# Patient Record
Sex: FEMALE | Race: WHITE | NOT HISPANIC OR LATINO
[De-identification: names, ages, dates, MRNs, and addresses within clinical notes are randomized per-mention and may not be internally consistent; named-entity substitution may affect disease eponyms.]

---

## 2017-12-05 ENCOUNTER — APPOINTMENT (OUTPATIENT)
Dept: INTERNAL MEDICINE | Facility: CLINIC | Age: 82
End: 2017-12-05
Payer: MEDICARE

## 2017-12-05 VITALS
HEART RATE: 70 BPM | DIASTOLIC BLOOD PRESSURE: 76 MMHG | RESPIRATION RATE: 16 BRPM | TEMPERATURE: 97.6 F | BODY MASS INDEX: 22.12 KG/M2 | SYSTOLIC BLOOD PRESSURE: 132 MMHG | WEIGHT: 119 LBS

## 2017-12-05 DIAGNOSIS — Z78.9 OTHER SPECIFIED HEALTH STATUS: ICD-10-CM

## 2017-12-05 DIAGNOSIS — Z86.39 PERSONAL HISTORY OF OTHER ENDOCRINE, NUTRITIONAL AND METABOLIC DISEASE: ICD-10-CM

## 2017-12-05 PROCEDURE — 99205 OFFICE O/P NEW HI 60 MIN: CPT

## 2018-03-20 ENCOUNTER — APPOINTMENT (OUTPATIENT)
Dept: INTERNAL MEDICINE | Facility: CLINIC | Age: 83
End: 2018-03-20
Payer: MEDICARE

## 2018-03-20 VITALS
TEMPERATURE: 98.3 F | HEART RATE: 74 BPM | RESPIRATION RATE: 16 BRPM | DIASTOLIC BLOOD PRESSURE: 74 MMHG | SYSTOLIC BLOOD PRESSURE: 128 MMHG

## 2018-03-20 VITALS — BODY MASS INDEX: 22.95 KG/M2 | WEIGHT: 123.46 LBS

## 2018-03-20 PROCEDURE — 99213 OFFICE O/P EST LOW 20 MIN: CPT

## 2018-03-20 RX ORDER — DIAZEPAM 5 MG/1
5 TABLET ORAL
Qty: 30 | Refills: 0 | Status: DISCONTINUED | COMMUNITY
Start: 2017-11-28

## 2018-07-31 ENCOUNTER — NON-APPOINTMENT (OUTPATIENT)
Age: 83
End: 2018-07-31

## 2018-07-31 ENCOUNTER — APPOINTMENT (OUTPATIENT)
Dept: INTERNAL MEDICINE | Facility: CLINIC | Age: 83
End: 2018-07-31
Payer: MEDICARE

## 2018-07-31 VITALS
HEART RATE: 78 BPM | TEMPERATURE: 96.7 F | DIASTOLIC BLOOD PRESSURE: 78 MMHG | RESPIRATION RATE: 14 BRPM | SYSTOLIC BLOOD PRESSURE: 160 MMHG

## 2018-07-31 VITALS — WEIGHT: 114 LBS | BODY MASS INDEX: 18.99 KG/M2 | HEIGHT: 65 IN

## 2018-07-31 DIAGNOSIS — J20.9 ACUTE BRONCHITIS, UNSPECIFIED: ICD-10-CM

## 2018-07-31 DIAGNOSIS — M81.0 AGE-RELATED OSTEOPOROSIS W/OUT CURRENT PATHOLOGICAL FRACTURE: ICD-10-CM

## 2018-07-31 DIAGNOSIS — N95.2 POSTMENOPAUSAL ATROPHIC VAGINITIS: ICD-10-CM

## 2018-07-31 PROCEDURE — G0439: CPT

## 2018-07-31 PROCEDURE — 93000 ELECTROCARDIOGRAM COMPLETE: CPT

## 2018-07-31 PROCEDURE — 99215 OFFICE O/P EST HI 40 MIN: CPT | Mod: 25

## 2018-07-31 PROCEDURE — 36415 COLL VENOUS BLD VENIPUNCTURE: CPT

## 2018-07-31 RX ORDER — PROMETHAZINE HYDROCHLORIDE AND DEXTROMETHORPHAN HYDROBROMIDE ORAL SOLUTION 15; 6.25 MG/5ML; MG/5ML
6.25-15 SOLUTION ORAL EVERY 6 HOURS
Qty: 400 | Refills: 1 | Status: DISCONTINUED | COMMUNITY
Start: 2017-12-05 | End: 2018-07-31

## 2018-07-31 RX ORDER — FUROSEMIDE 40 MG/1
40 TABLET ORAL
Qty: 90 | Refills: 0 | Status: DISCONTINUED | COMMUNITY
Start: 2017-06-07 | End: 2018-07-31

## 2018-07-31 RX ORDER — ALENDRONATE SODIUM 70 MG/1
70 TABLET ORAL
Qty: 4 | Refills: 0 | Status: DISCONTINUED | COMMUNITY
Start: 2018-02-09 | End: 2018-07-31

## 2018-07-31 NOTE — ASSESSMENT
[FreeTextEntry1] : Diagnoses #1 rheumatic mitral stenosis, stable. Some mild dyspnea on exertion is chronic. Patient is followed by the cardiologist\par #2 hypertension. Blood pressure is elevated. Patient refused to take any medications for that. patient to be on a low-salt diet and she's going to check her blood pressure at her house. Patient is going to report to me if the blood pressure is elevated.\par #3 hyperlipidemia. Continue same treatment and low fat diet\par #4 rule out liver toxicity due to chronic medication use\par #5 osteoporosis. Patient has prolia  injections every 6 months by the rheumatologist\par #6 rheumatoid arthritis, stable . Patient is followed by the rheumatologist\par #7 history of pulmonary fibrosis, stable. Patient is followed by the pulmonary doctor\par #8 slight depression. Patient is not suicidal. Patient refused to increase escitalopram to 10 mg p.o. once a day\par Plan. Patient advised to return 6 weeks

## 2018-07-31 NOTE — HEALTH RISK ASSESSMENT
[Good] : ~his/her~ current health as good [Patient reported mammogram was normal] : Patient reported mammogram was normal [Patient reported PAP Smear was normal] : Patient reported PAP Smear was normal [None] : None [With Family] : lives with family [Retired] : retired [High School] : high school [] :  [Fully functional (bathing, dressing, toileting, transferring, walking, feeding)] : Fully functional (bathing, dressing, toileting, transferring, walking, feeding) [Fully functional (using the telephone, shopping, preparing meals, housekeeping, doing laundry, using] : Fully functional and needs no help or supervision to perform IADLs (using the telephone, shopping, preparing meals, housekeeping, doing laundry, using transportation, managing medications and managing finances) [Smoke Detector] : smoke detector [Carbon Monoxide Detector] : carbon monoxide detector [Safety elements used in home] : safety elements used in home [Seat Belt] :  uses seat belt [Sunscreen] : uses sunscreen [Caregiver Concerns] : has caregiver concerns [Discussed at today's visit] : Advance Directives Discussed at today's visit [] : No [Change in mental status noted] : No change in mental status noted [Language] : denies difficulty with language [Sexually Active] : not sexually active [Reports changes in hearing] : Reports no changes in hearing [Reports changes in vision] : Reports no changes in vision [Reports changes in dental health] : Reports no changes in dental health [Guns at Home] : no guns at home [Travel to Developing Areas] : does not  travel to developing areas [TB Exposure] : is not being exposed to tuberculosis

## 2018-07-31 NOTE — HISTORY OF PRESENT ILLNESS
[FreeTextEntry1] : Patient comes for a Medicare annual wellness visit and followup of her chronic medical problems like rheumatic mitral stenosis , hypertension, hyperlipidemia, rheumatoid arthritis. Patient feels well without any chest pain, weakness, paroxysmal nocturnal dyspnea ,orthopnea. She has though  chronic slight dyspnea on exception and she is followed by the cardiologist for that

## 2018-07-31 NOTE — PHYSICAL EXAM
[No Acute Distress] : no acute distress [Well Nourished] : well nourished [Well Developed] : well developed [Well-Appearing] : well-appearing [Normal Sclera/Conjunctiva] : normal sclera/conjunctiva [PERRL] : pupils equal round and reactive to light [EOMI] : extraocular movements intact [Normal Outer Ear/Nose] : the outer ears and nose were normal in appearance [Normal Oropharynx] : the oropharynx was normal [Normal TMs] : both tympanic membranes were normal [Normal Nasal Mucosa] : the nasal mucosa was normal [No JVD] : no jugular venous distention [Supple] : supple [No Lymphadenopathy] : no lymphadenopathy [Thyroid Normal, No Nodules] : the thyroid was normal and there were no nodules present [No Respiratory Distress] : no respiratory distress  [Clear to Auscultation] : lungs were clear to auscultation bilaterally [No Accessory Muscle Use] : no accessory muscle use [Normal Percussion] : the chest was normal to percussion [Normal Rate] : normal rate  [Regular Rhythm] : with a regular rhythm [Normal S1, S2] : normal S1 and S2 [No Murmur] : no murmur heard [No Carotid Bruits] : no carotid bruits [No Abdominal Bruit] : a ~M bruit was not heard ~T in the abdomen [No Varicosities] : no varicosities [Pedal Pulses Present] : the pedal pulses are present [No Edema] : there was no peripheral edema [No Extremity Clubbing/Cyanosis] : no extremity clubbing/cyanosis [No Palpable Aorta] : no palpable aorta [Normal Appearance] : normal in appearance [No Nipple Discharge] : no nipple discharge [No Axillary Lymphadenopathy] : no axillary lymphadenopathy [Soft] : abdomen soft [Non Tender] : non-tender [Non-distended] : non-distended [No Masses] : no abdominal mass palpated [No HSM] : no HSM [Normal Bowel Sounds] : normal bowel sounds [No Hernias] : no hernias [Normal Sphincter Tone] : normal sphincter tone [No Mass] : no mass [Normal Axillary Nodes] : no axillary lymphadenopathy [Normal Posterior Cervical Nodes] : no posterior cervical lymphadenopathy [Normal Femoral Nodes] : no femoral lymphadenopathy [Normal Anterior Cervical Nodes] : no anterior cervical lymphadenopathy [Normal Inguinal Nodes] : no inguinal lymphadenopathy [No CVA Tenderness] : no CVA  tenderness [No Spinal Tenderness] : no spinal tenderness [No Joint Swelling] : no joint swelling [Grossly Normal Strength/Tone] : grossly normal strength/tone [No Rash] : no rash [No Skin Lesions] : no skin lesions [Normal Gait] : normal gait [Coordination Grossly Intact] : coordination grossly intact [No Focal Deficits] : no focal deficits [Deep Tendon Reflexes (DTR)] : deep tendon reflexes were 2+ and symmetric [Speech Grossly Normal] : speech grossly normal [Memory Grossly Normal] : memory grossly normal [Normal Affect] : the affect was normal [Alert and Oriented x3] : oriented to person, place, and time [Normal Mood] : the mood was normal [Normal Insight/Judgement] : insight and judgment were intact [Stool Occult Blood] : no occult stool [Kyphosis] : no kyphosis [Scoliosis] : no scoliosis

## 2018-08-01 LAB
25(OH)D3 SERPL-MCNC: 26.6 NG/ML
ALBUMIN SERPL ELPH-MCNC: 4.5 G/DL
ALP BLD-CCNC: 65 U/L
ALT SERPL-CCNC: 17 U/L
ANION GAP SERPL CALC-SCNC: 13 MMOL/L
AST SERPL-CCNC: 28 U/L
BASOPHILS # BLD AUTO: 0.03 K/UL
BASOPHILS NFR BLD AUTO: 0.4 %
BILIRUB SERPL-MCNC: 0.8 MG/DL
BUN SERPL-MCNC: 23 MG/DL
CALCIUM SERPL-MCNC: 9.1 MG/DL
CHLORIDE SERPL-SCNC: 99 MMOL/L
CHOLEST SERPL-MCNC: 180 MG/DL
CHOLEST/HDLC SERPL: 2.8 RATIO
CO2 SERPL-SCNC: 27 MMOL/L
CREAT SERPL-MCNC: 0.87 MG/DL
EOSINOPHIL # BLD AUTO: 0.16 K/UL
EOSINOPHIL NFR BLD AUTO: 2.2 %
GLUCOSE SERPL-MCNC: 93 MG/DL
HCT VFR BLD CALC: 41.2 %
HDLC SERPL-MCNC: 65 MG/DL
HGB BLD-MCNC: 12.4 G/DL
IMM GRANULOCYTES NFR BLD AUTO: 0.3 %
LDLC SERPL CALC-MCNC: 99 MG/DL
LYMPHOCYTES # BLD AUTO: 2.22 K/UL
LYMPHOCYTES NFR BLD AUTO: 31.2 %
MAN DIFF?: NORMAL
MCHC RBC-ENTMCNC: 29.4 PG
MCHC RBC-ENTMCNC: 30.1 GM/DL
MCV RBC AUTO: 97.6 FL
MONOCYTES # BLD AUTO: 0.41 K/UL
MONOCYTES NFR BLD AUTO: 5.8 %
NEUTROPHILS # BLD AUTO: 4.28 K/UL
NEUTROPHILS NFR BLD AUTO: 60.1 %
PLATELET # BLD AUTO: 250 K/UL
POTASSIUM SERPL-SCNC: 4.3 MMOL/L
PROT SERPL-MCNC: 7.1 G/DL
RBC # BLD: 4.22 M/UL
RBC # FLD: 15 %
SODIUM SERPL-SCNC: 139 MMOL/L
TRIGL SERPL-MCNC: 82 MG/DL
WBC # FLD AUTO: 7.12 K/UL

## 2018-12-03 ENCOUNTER — APPOINTMENT (OUTPATIENT)
Dept: INTERNAL MEDICINE | Facility: CLINIC | Age: 83
End: 2018-12-03
Payer: MEDICARE

## 2018-12-03 ENCOUNTER — LABORATORY RESULT (OUTPATIENT)
Age: 83
End: 2018-12-03

## 2018-12-03 VITALS
SYSTOLIC BLOOD PRESSURE: 140 MMHG | DIASTOLIC BLOOD PRESSURE: 72 MMHG | RESPIRATION RATE: 16 BRPM | BODY MASS INDEX: 19.16 KG/M2 | WEIGHT: 115 LBS | HEIGHT: 65 IN | TEMPERATURE: 98.3 F | OXYGEN SATURATION: 98 % | HEART RATE: 80 BPM

## 2018-12-03 LAB
BILIRUB UR QL STRIP: NEGATIVE
GLUCOSE UR-MCNC: NEGATIVE
HCG UR QL: 0.2 EU/DL
HGB UR QL STRIP.AUTO: ABNORMAL
KETONES UR-MCNC: NEGATIVE
LEUKOCYTE ESTERASE UR QL STRIP: ABNORMAL
NITRITE UR QL STRIP: NEGATIVE
PH UR STRIP: 7
PROT UR STRIP-MCNC: NEGATIVE
SP GR UR STRIP: 1.01

## 2018-12-03 PROCEDURE — 81003 URINALYSIS AUTO W/O SCOPE: CPT | Mod: QW

## 2018-12-03 PROCEDURE — 90662 IIV NO PRSV INCREASED AG IM: CPT

## 2018-12-03 PROCEDURE — 99215 OFFICE O/P EST HI 40 MIN: CPT | Mod: 25

## 2018-12-03 PROCEDURE — G0008: CPT

## 2018-12-03 NOTE — HISTORY OF PRESENT ILLNESS
[FreeTextEntry1] : Patient presents for chronic disease management [de-identified] : Patient presents for chronic disease management. Patient concerned about left rib and under arm pain. Pain has been for 6 months.  Patient denies any trauma, fevers, chills or unexpected weight loss. Patient denies any sudden movements. Patient denies any chest tightness or shortness of breath. Patient states she has depression after son passed away. States talking about it makes her feel better. Patient states she has not taken escitalopram.

## 2018-12-03 NOTE — ASSESSMENT
[FreeTextEntry1] : 1) HTN: stable, advised to continue current management. Counseled on low salt diet\par 2) Hyperlipidemia: Will check lipid panel, discussed low fat diet.\par 3) Rib pain on left side: Patient has tenderness to palpation of left side of ribs. Patient denies any trauma. May be secondary to RA? or muscle strain. Given point tenderness will check chest x-ray\par 4) Rheumatic mitral stenosis: Patient denies any dyspnea, will f/u with cardiology at the end of the month.\par 5) Reactive depression: PHQ-9 score of 4 consistent with mild depression. Patient declined talk therapy and not currently interested in SSRI. 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 Patient denies any dyspnea. \par 7)Hypovitaminosis D: Check Vitamin D levels\par RTO 2 months.

## 2018-12-03 NOTE — REVIEW OF SYSTEMS
[Joint Pain] : joint pain [Negative] : Integumentary [Joint Stiffness] : no joint stiffness [Joint Swelling] : no joint swelling [Muscle Weakness] : no muscle weakness [Muscle Pain] : no muscle pain [Back Pain] : no back pain

## 2018-12-03 NOTE — PHYSICAL EXAM
[No Acute Distress] : no acute distress [Well Nourished] : well nourished [Well Developed] : well developed [Well-Appearing] : well-appearing [Normal Sclera/Conjunctiva] : normal sclera/conjunctiva [PERRL] : pupils equal round and reactive to light [EOMI] : extraocular movements intact [Normal Outer Ear/Nose] : the outer ears and nose were normal in appearance [Normal Oropharynx] : the oropharynx was normal [Normal TMs] : both tympanic membranes were normal [Normal Nasal Mucosa] : the nasal mucosa was normal [No JVD] : no jugular venous distention [Supple] : supple [No Lymphadenopathy] : no lymphadenopathy [Thyroid Normal, No Nodules] : the thyroid was normal and there were no nodules present [No Respiratory Distress] : no respiratory distress  [No Accessory Muscle Use] : no accessory muscle use [Normal Rate] : normal rate  [Regular Rhythm] : with a regular rhythm [Normal S1, S2] : normal S1 and S2 [No Carotid Bruits] : no carotid bruits [No Abdominal Bruit] : a ~M bruit was not heard ~T in the abdomen [No Varicosities] : no varicosities [Pedal Pulses Present] : the pedal pulses are present [No Edema] : there was no peripheral edema [No Extremity Clubbing/Cyanosis] : no extremity clubbing/cyanosis [No Palpable Aorta] : no palpable aorta [Normal Appearance] : normal in appearance [No Nipple Discharge] : no nipple discharge [Soft] : abdomen soft [Non Tender] : non-tender [Non-distended] : non-distended [No Masses] : no abdominal mass palpated [No HSM] : no HSM [Normal Bowel Sounds] : normal bowel sounds [Normal Supraclavicular Nodes] : no supraclavicular lymphadenopathy [Normal Posterior Cervical Nodes] : no posterior cervical lymphadenopathy [Normal Anterior Cervical Nodes] : no anterior cervical lymphadenopathy [No CVA Tenderness] : no CVA  tenderness [No Spinal Tenderness] : no spinal tenderness [No Joint Swelling] : no joint swelling [Grossly Normal Strength/Tone] : grossly normal strength/tone [No Rash] : no rash [Normal Gait] : normal gait [Coordination Grossly Intact] : coordination grossly intact [No Focal Deficits] : no focal deficits [Speech Grossly Normal] : speech grossly normal [Normal Insight/Judgement] : insight and judgment were intact [de-identified] : Fine crackles heard bilaterally across all lung zones [de-identified] : Tenderness to palpation of left ribs [de-identified] : Tearful during exam

## 2018-12-03 NOTE — HEALTH RISK ASSESSMENT
[Very Good] : ~his/her~  mood as very good [No falls in past year] : Patient reported no falls in the past year [1] : 2) Feeling down, depressed, or hopeless for several days (1) [None] : None [With Significant Other] : lives with significant other [] :  [Feels Safe at Home] : Feels safe at home [Fully functional (bathing, dressing, toileting, transferring, walking, feeding)] : Fully functional (bathing, dressing, toileting, transferring, walking, feeding) [Fully functional (using the telephone, shopping, preparing meals, housekeeping, doing laundry, using] : Fully functional and needs no help or supervision to perform IADLs (using the telephone, shopping, preparing meals, housekeeping, doing laundry, using transportation, managing medications and managing finances) [Smoke Detector] : smoke detector [Guns at Home] : guns at home [Safety elements used in home] : safety elements used in home [Seat Belt] :  uses seat belt [Sunscreen] : uses sunscreen [] : No [[id number]] : 4 [Change in mental status noted] : No change in mental status noted [Language] : denies difficulty with language [Behavior] : denies difficulty with behavior [Learning/Retaining New Information] : denies difficulty learning/retaining new information [Handling Complex Tasks] : denies difficulty handling complex tasks [Reasoning] : denies difficulty with reasoning [Spatial Ability and Orientation] : denies difficulty with spatial ability and orientation [Sexually Active] : not sexually active [High Risk Behavior] : no high risk behavior [Reports changes in hearing] : Reports no changes in hearing [Reports changes in vision] : Reports no changes in vision [Reports changes in dental health] : Reports no changes in dental health [Carbon Monoxide Detector] : no carbon monoxide detector [Travel to Developing Areas] : does not  travel to developing areas [TB Exposure] : is not being exposed to tuberculosis [Caregiver Concerns] : does not have caregiver concerns [de-identified] : Discussed carbon monoxide detectors

## 2018-12-04 ENCOUNTER — MESSAGE (OUTPATIENT)
Age: 83
End: 2018-12-04

## 2018-12-04 LAB
ALBUMIN SERPL ELPH-MCNC: 4.3 G/DL
ALP BLD-CCNC: 67 U/L
ALT SERPL-CCNC: 14 U/L
ANION GAP SERPL CALC-SCNC: 10 MMOL/L
APPEARANCE: CLEAR
AST SERPL-CCNC: 25 U/L
BASOPHILS # BLD AUTO: 0.04 K/UL
BASOPHILS NFR BLD AUTO: 0.5 %
BILIRUB SERPL-MCNC: 0.7 MG/DL
BILIRUBIN URINE: NEGATIVE
BLOOD URINE: NEGATIVE
BUN SERPL-MCNC: 19 MG/DL
CALCIUM SERPL-MCNC: 9.3 MG/DL
CHLORIDE SERPL-SCNC: 104 MMOL/L
CHOLEST SERPL-MCNC: 187 MG/DL
CHOLEST/HDLC SERPL: 2.9 RATIO
CO2 SERPL-SCNC: 30 MMOL/L
COLOR: YELLOW
CREAT SERPL-MCNC: 0.89 MG/DL
EOSINOPHIL # BLD AUTO: 0.25 K/UL
EOSINOPHIL NFR BLD AUTO: 3.2 %
GLUCOSE QUALITATIVE U: NEGATIVE MG/DL
GLUCOSE SERPL-MCNC: 95 MG/DL
HBA1C MFR BLD HPLC: 5.9 %
HCT VFR BLD CALC: 40.3 %
HDLC SERPL-MCNC: 65 MG/DL
HGB BLD-MCNC: 12.3 G/DL
IMM GRANULOCYTES NFR BLD AUTO: 0.3 %
KETONES URINE: NEGATIVE
LDLC SERPL CALC-MCNC: 106 MG/DL
LEUKOCYTE ESTERASE URINE: ABNORMAL
LYMPHOCYTES # BLD AUTO: 2.03 K/UL
LYMPHOCYTES NFR BLD AUTO: 26 %
MAN DIFF?: NORMAL
MCHC RBC-ENTMCNC: 30 PG
MCHC RBC-ENTMCNC: 30.5 GM/DL
MCV RBC AUTO: 98.3 FL
MONOCYTES # BLD AUTO: 0.62 K/UL
MONOCYTES NFR BLD AUTO: 7.9 %
NEUTROPHILS # BLD AUTO: 4.86 K/UL
NEUTROPHILS NFR BLD AUTO: 62.1 %
NITRITE URINE: NEGATIVE
PH URINE: 7
PLATELET # BLD AUTO: 243 K/UL
POTASSIUM SERPL-SCNC: 4.5 MMOL/L
PROT SERPL-MCNC: 7.1 G/DL
PROTEIN URINE: NEGATIVE MG/DL
RBC # BLD: 4.1 M/UL
RBC # FLD: 15.3 %
SODIUM SERPL-SCNC: 143 MMOL/L
SPECIFIC GRAVITY URINE: 1.01
TRIGL SERPL-MCNC: 79 MG/DL
UROBILINOGEN URINE: NEGATIVE MG/DL
WBC # FLD AUTO: 7.82 K/UL

## 2018-12-05 LAB — 25(OH)D3 SERPL-MCNC: 32.1 NG/ML

## 2018-12-06 LAB
TSH SERPL-ACNC: 4.57 UIU/ML
VIT B12 SERPL-MCNC: 1063 PG/ML

## 2019-02-01 ENCOUNTER — RX RENEWAL (OUTPATIENT)
Age: 84
End: 2019-02-01

## 2019-02-01 RX ORDER — ERGOCALCIFEROL 1.25 MG/1
1.25 MG CAPSULE, LIQUID FILLED ORAL
Qty: 12 | Refills: 3 | Status: DISCONTINUED | COMMUNITY
Start: 2018-08-01 | End: 2019-02-01

## 2020-01-06 ENCOUNTER — APPOINTMENT (OUTPATIENT)
Dept: INTERNAL MEDICINE | Facility: CLINIC | Age: 85
End: 2020-01-06
Payer: MEDICARE

## 2020-01-06 VITALS — RESPIRATION RATE: 14 BRPM | DIASTOLIC BLOOD PRESSURE: 62 MMHG | SYSTOLIC BLOOD PRESSURE: 134 MMHG

## 2020-01-06 VITALS
OXYGEN SATURATION: 98 % | BODY MASS INDEX: 21.57 KG/M2 | HEIGHT: 62.6 IN | DIASTOLIC BLOOD PRESSURE: 67 MMHG | HEART RATE: 62 BPM | TEMPERATURE: 98.3 F | SYSTOLIC BLOOD PRESSURE: 159 MMHG | WEIGHT: 120.25 LBS

## 2020-01-06 DIAGNOSIS — E78.00 PURE HYPERCHOLESTEROLEMIA, UNSPECIFIED: ICD-10-CM

## 2020-01-06 PROCEDURE — G0439: CPT | Mod: 25

## 2020-01-06 PROCEDURE — 90732 PPSV23 VACC 2 YRS+ SUBQ/IM: CPT

## 2020-01-06 PROCEDURE — G0444 DEPRESSION SCREEN ANNUAL: CPT

## 2020-01-06 PROCEDURE — 99215 OFFICE O/P EST HI 40 MIN: CPT | Mod: 25

## 2020-01-06 PROCEDURE — 36415 COLL VENOUS BLD VENIPUNCTURE: CPT

## 2020-01-06 PROCEDURE — G0009: CPT

## 2020-01-06 NOTE — REVIEW OF SYSTEMS
[Headache] : headache [Joint Pain] : joint pain [Anxiety] : anxiety [Negative] : Heme/Lymph [FreeTextEntry2] : dizziness

## 2020-01-06 NOTE — HISTORY OF PRESENT ILLNESS
[de-identified] : Patient comes for her Medicare annual wellness visit and followup of her chronic medical problems. She feels lightheadeadness and  she had pain in the left occipital area which was severe approximate  one week ago. The pain was like pressure and relieve spontaneously. Patient has also a posterior neck pain which sometimes is bothersome.Patient has also  sometimes headache which spreads to the entire head. No neuro deficits symptoms. Not any  anterior chest pain, shortness of breath, palpitations, paroxysmal nocturnal dyspnea orthopnea .Patient is also under a lot of stress because she her   suffers from dementia and she has to take care of him

## 2020-01-06 NOTE — HEALTH RISK ASSESSMENT
[Good] : ~his/her~ current health as good [Very Good] : ~his/her~  mood as very good [] : No [No falls in past year] : Patient reported no falls in the past year [de-identified] : socialy [Change in mental status noted] : No change in mental status noted [Language] : denies difficulty with language [None] : None [Retired] : retired [With Family] : lives with family [] :  [High School] : high school [Feels Safe at Home] : Feels safe at home [Sexually Active] : not sexually active [Fully functional (bathing, dressing, toileting, transferring, walking, feeding)] : Fully functional (bathing, dressing, toileting, transferring, walking, feeding) [Fully functional (using the telephone, shopping, preparing meals, housekeeping, doing laundry, using] : Fully functional and needs no help or supervision to perform IADLs (using the telephone, shopping, preparing meals, housekeeping, doing laundry, using transportation, managing medications and managing finances) [Reports changes in hearing] : Reports no changes in hearing [Reports changes in dental health] : Reports no changes in dental health [Reports changes in vision] : Reports no changes in vision [Smoke Detector] : smoke detector [Carbon Monoxide Detector] : carbon monoxide detector [Guns at Home] : no guns at home [Safety elements used in home] : safety elements used in home [Seat Belt] :  uses seat belt [Sunscreen] : uses sunscreen [Travel to Developing Areas] : does not  travel to developing areas [Caregiver Concerns] : has caregiver concerns [TB Exposure] : is not being exposed to tuberculosis [Discussed at today's visit] : Advance Directives Discussed at today's visit

## 2020-01-06 NOTE — ASSESSMENT
[FreeTextEntry1] : Diagnoses #1 Dizziness of unknown etiology most likely secondary to anxiety. Patient have  MRI of the head to rule out any intracranial pathology and carotid Doppler to rule out any possibility of carotid stenosis. Patient to see also her  cardiologist, Dr. Lopez\par #2 headache. Patient have MRI of her  head to rule out any intracranial pathology and sedimentation rate for any possibility of temporal arteritis. Patient has no TA tenderness. Take Tylenol 500 mg p.o. q.6 hours p.r.n.\par #3 hyperlipidemia. Continue same treatment and low fat diet\par #4 rule out liver toxicity due to chronic medication use\par #5 osteoporosis. Patient is f/u  by the rheumatologist\par #6 rheumatoid arthritis, stable . Patient is followed by the rheumatologist\par #7 history of pulmonary fibrosis, stable. Patient is followed by the pulmonary doctor\par #8 anxiety,slight depression. Patient is not suicidal. Patient refused to increase escitalopram to 10 mg p.o. once a day\par #9 cervical spine pain most likely secondary to radiculopathy. Patient have  MRI of his neck.\par #10 heart murmur. Patient is followed by her cardiologist\par Plan. Patient advised to return 6 weeks

## 2020-01-06 NOTE — PHYSICAL EXAM
[Well Nourished] : well nourished [No Acute Distress] : no acute distress [Well-Appearing] : well-appearing [Well Developed] : well developed [Normal Voice/Communication] : normal voice/communication [PERRL] : pupils equal round and reactive to light [EOMI] : extraocular movements intact [Normal Sclera/Conjunctiva] : normal sclera/conjunctiva [Normal Outer Ear/Nose] : the outer ears and nose were normal in appearance [Normal TMs] : both tympanic membranes were normal [Normal Oropharynx] : the oropharynx was normal [No JVD] : no jugular venous distention [Normal Nasal Mucosa] : the nasal mucosa was normal [Supple] : supple [No Lymphadenopathy] : no lymphadenopathy [Thyroid Normal, No Nodules] : the thyroid was normal and there were no nodules present [Clear to Auscultation] : lungs were clear to auscultation bilaterally [No Respiratory Distress] : no respiratory distress  [No Accessory Muscle Use] : no accessory muscle use [Normal Percussion] : the chest was normal to percussion [Regular Rhythm] : with a regular rhythm [Normal Rate] : normal rate  [No Murmur] : no murmur heard [Normal S1, S2] : normal S1 and S2 [No Carotid Bruits] : no carotid bruits [No Varicosities] : no varicosities [No Abdominal Bruit] : a ~M bruit was not heard ~T in the abdomen [Pedal Pulses Present] : the pedal pulses are present [No Edema] : there was no peripheral edema [No Extremity Clubbing/Cyanosis] : no extremity clubbing/cyanosis [No Palpable Aorta] : no palpable aorta [Normal Appearance] : normal in appearance [No Axillary Lymphadenopathy] : no axillary lymphadenopathy [No Nipple Discharge] : no nipple discharge [Soft] : abdomen soft [No Masses] : no abdominal mass palpated [Non Tender] : non-tender [Non-distended] : non-distended [Normal Bowel Sounds] : normal bowel sounds [No HSM] : no HSM [Declined Rectal Exam] : declined rectal exam [No Hernias] : no hernias [Normal Supraclavicular Nodes] : no supraclavicular lymphadenopathy [Normal Femoral Nodes] : no femoral lymphadenopathy [Normal Posterior Cervical Nodes] : no posterior cervical lymphadenopathy [Normal Axillary Nodes] : no axillary lymphadenopathy [Normal Anterior Cervical Nodes] : no anterior cervical lymphadenopathy [Normal Inguinal Nodes] : no inguinal lymphadenopathy [No CVA Tenderness] : no CVA  tenderness [No Spinal Tenderness] : no spinal tenderness [Kyphosis] : no kyphosis [No Joint Swelling] : no joint swelling [Scoliosis] : no scoliosis [Grossly Normal Strength/Tone] : grossly normal strength/tone [No Rash] : no rash [No Skin Lesions] : no skin lesions [Normal Gait] : normal gait [Coordination Grossly Intact] : coordination grossly intact [Deep Tendon Reflexes (DTR)] : deep tendon reflexes were 2+ and symmetric [No Focal Deficits] : no focal deficits [Speech Grossly Normal] : speech grossly normal [Memory Grossly Normal] : memory grossly normal [Normal Affect] : the affect was normal [Alert and Oriented x3] : oriented to person, place, and time [Normal Insight/Judgement] : insight and judgment were intact [Normal Mood] : the mood was normal [de-identified] : 2/6 systolic murmur at the apex

## 2020-01-07 DIAGNOSIS — R31.29 OTHER MICROSCOPIC HEMATURIA: ICD-10-CM

## 2020-01-07 LAB
25(OH)D3 SERPL-MCNC: 36.2 NG/ML
ALBUMIN SERPL ELPH-MCNC: 4.4 G/DL
ALP BLD-CCNC: 63 U/L
ALT SERPL-CCNC: 13 U/L
ANION GAP SERPL CALC-SCNC: 11 MMOL/L
APPEARANCE: CLEAR
AST SERPL-CCNC: 24 U/L
BACTERIA: NEGATIVE
BASOPHILS # BLD AUTO: 0.07 K/UL
BASOPHILS NFR BLD AUTO: 0.8 %
BILIRUB SERPL-MCNC: 0.6 MG/DL
BILIRUBIN URINE: NEGATIVE
BLOOD URINE: NEGATIVE
BUN SERPL-MCNC: 22 MG/DL
CALCIUM SERPL-MCNC: 9.6 MG/DL
CHLORIDE SERPL-SCNC: 107 MMOL/L
CHOLEST SERPL-MCNC: 203 MG/DL
CHOLEST/HDLC SERPL: 2.9 RATIO
CO2 SERPL-SCNC: 26 MMOL/L
COLOR: NORMAL
CREAT SERPL-MCNC: 0.83 MG/DL
EOSINOPHIL # BLD AUTO: 0.22 K/UL
EOSINOPHIL NFR BLD AUTO: 2.7 %
ERYTHROCYTE [SEDIMENTATION RATE] IN BLOOD BY WESTERGREN METHOD: 23 MM/HR
GLUCOSE QUALITATIVE U: NEGATIVE
GLUCOSE SERPL-MCNC: 99 MG/DL
HCT VFR BLD CALC: 42 %
HDLC SERPL-MCNC: 69 MG/DL
HGB BLD-MCNC: 13 G/DL
HYALINE CASTS: 0 /LPF
IMM GRANULOCYTES NFR BLD AUTO: 0.2 %
KETONES URINE: NEGATIVE
LDLC SERPL CALC-MCNC: 115 MG/DL
LEUKOCYTE ESTERASE URINE: ABNORMAL
LYMPHOCYTES # BLD AUTO: 1.8 K/UL
LYMPHOCYTES NFR BLD AUTO: 21.8 %
MAN DIFF?: NORMAL
MCHC RBC-ENTMCNC: 30.6 PG
MCHC RBC-ENTMCNC: 31 GM/DL
MCV RBC AUTO: 98.8 FL
MICROSCOPIC-UA: NORMAL
MONOCYTES # BLD AUTO: 0.56 K/UL
MONOCYTES NFR BLD AUTO: 6.8 %
NEUTROPHILS # BLD AUTO: 5.59 K/UL
NEUTROPHILS NFR BLD AUTO: 67.7 %
NITRITE URINE: NEGATIVE
PH URINE: 6.5
PLATELET # BLD AUTO: 272 K/UL
POTASSIUM SERPL-SCNC: 4.9 MMOL/L
PROT SERPL-MCNC: 7.1 G/DL
PROTEIN URINE: NORMAL
RBC # BLD: 4.25 M/UL
RBC # FLD: 13.6 %
RED BLOOD CELLS URINE: 8 /HPF
SODIUM SERPL-SCNC: 144 MMOL/L
SPECIFIC GRAVITY URINE: 1.02
SQUAMOUS EPITHELIAL CELLS: 2 /HPF
TRIGL SERPL-MCNC: 96 MG/DL
UROBILINOGEN URINE: NORMAL
WBC # FLD AUTO: 8.26 K/UL
WHITE BLOOD CELLS URINE: 3 /HPF

## 2020-01-09 ENCOUNTER — APPOINTMENT (OUTPATIENT)
Dept: OBGYN | Facility: CLINIC | Age: 85
End: 2020-01-09
Payer: MEDICARE

## 2020-01-18 ENCOUNTER — OUTPATIENT (OUTPATIENT)
Dept: OUTPATIENT SERVICES | Facility: HOSPITAL | Age: 85
LOS: 1 days | End: 2020-01-18

## 2020-01-18 ENCOUNTER — APPOINTMENT (OUTPATIENT)
Dept: MRI IMAGING | Facility: CLINIC | Age: 85
End: 2020-01-18

## 2020-01-18 DIAGNOSIS — R42 DIZZINESS AND GIDDINESS: ICD-10-CM

## 2020-01-23 ENCOUNTER — APPOINTMENT (OUTPATIENT)
Dept: OBGYN | Facility: CLINIC | Age: 85
End: 2020-01-23
Payer: MEDICARE

## 2020-01-23 VITALS
SYSTOLIC BLOOD PRESSURE: 120 MMHG | HEIGHT: 62 IN | DIASTOLIC BLOOD PRESSURE: 72 MMHG | WEIGHT: 121 LBS | BODY MASS INDEX: 22.26 KG/M2 | HEART RATE: 66 BPM | OXYGEN SATURATION: 96 %

## 2020-01-23 PROCEDURE — 99387 INIT PM E/M NEW PAT 65+ YRS: CPT

## 2020-01-23 RX ORDER — CLONAZEPAM 1 MG/1
1 TABLET ORAL
Qty: 30 | Refills: 0 | Status: DISCONTINUED | COMMUNITY
Start: 2018-06-16 | End: 2020-01-23

## 2020-01-23 RX ORDER — UBIDECARENONE/VIT E ACET 100MG-5
50 MCG CAPSULE ORAL
Qty: 90 | Refills: 3 | Status: DISCONTINUED | COMMUNITY
Start: 2019-02-01 | End: 2020-01-23

## 2020-01-23 NOTE — CHIEF COMPLAINT
[Annual Visit] : annual visit [FreeTextEntry1] : pt visits [place] once a year--where she has family

## 2020-01-23 NOTE — HISTORY OF PRESENT ILLNESS
[Good] : being in good health [Last Mammogram ___] : Last Mammogram was [unfilled] [Last Bone Density ___] : Last bone density studies [unfilled] [Last Colonoscopy ___] : Last colonoscopy [unfilled] [Last Pap ___] : Last cervical pap smear was [unfilled] [Postmenopausal] : is postmenopausal [HPV Vaccine NA Due to Age] : HPV vaccine not available to patient due to age

## 2020-01-23 NOTE — CHIEF COMPLAINT
[Annual Visit] : annual visit [FreeTextEntry1] : pt visits [place] once a year--where she has family

## 2020-01-24 LAB — HPV HIGH+LOW RISK DNA PNL CVX: NOT DETECTED

## 2020-01-28 LAB — CYTOLOGY CVX/VAG DOC THIN PREP: NORMAL

## 2020-02-08 ENCOUNTER — FORM ENCOUNTER (OUTPATIENT)
Age: 85
End: 2020-02-08

## 2020-02-09 ENCOUNTER — APPOINTMENT (OUTPATIENT)
Dept: MRI IMAGING | Facility: CLINIC | Age: 85
End: 2020-02-09
Payer: MEDICARE

## 2020-02-09 ENCOUNTER — OUTPATIENT (OUTPATIENT)
Dept: OUTPATIENT SERVICES | Facility: HOSPITAL | Age: 85
LOS: 1 days | End: 2020-02-09
Payer: MEDICARE

## 2020-02-09 DIAGNOSIS — R42 DIZZINESS AND GIDDINESS: ICD-10-CM

## 2020-02-09 PROCEDURE — 72141 MRI NECK SPINE W/O DYE: CPT | Mod: 26

## 2020-02-09 PROCEDURE — 72141 MRI NECK SPINE W/O DYE: CPT

## 2020-02-09 PROCEDURE — 70551 MRI BRAIN STEM W/O DYE: CPT | Mod: 26

## 2020-02-09 PROCEDURE — 70551 MRI BRAIN STEM W/O DYE: CPT

## 2020-02-11 ENCOUNTER — FORM ENCOUNTER (OUTPATIENT)
Age: 85
End: 2020-02-11

## 2020-02-12 ENCOUNTER — TRANSCRIPTION ENCOUNTER (OUTPATIENT)
Age: 85
End: 2020-02-12

## 2020-02-12 ENCOUNTER — OUTPATIENT (OUTPATIENT)
Dept: OUTPATIENT SERVICES | Facility: HOSPITAL | Age: 85
LOS: 1 days | End: 2020-02-12
Payer: MEDICARE

## 2020-02-12 ENCOUNTER — APPOINTMENT (OUTPATIENT)
Dept: ULTRASOUND IMAGING | Facility: CLINIC | Age: 85
End: 2020-02-12
Payer: MEDICARE

## 2020-02-12 DIAGNOSIS — Z00.8 ENCOUNTER FOR OTHER GENERAL EXAMINATION: ICD-10-CM

## 2020-02-12 PROCEDURE — 93880 EXTRACRANIAL BILAT STUDY: CPT

## 2020-02-12 PROCEDURE — 93880 EXTRACRANIAL BILAT STUDY: CPT | Mod: 26

## 2020-04-01 ENCOUNTER — TRANSCRIPTION ENCOUNTER (OUTPATIENT)
Age: 85
End: 2020-04-01

## 2020-05-21 ENCOUNTER — APPOINTMENT (OUTPATIENT)
Dept: INTERNAL MEDICINE | Facility: CLINIC | Age: 85
End: 2020-05-21
Payer: MEDICARE

## 2020-05-21 VITALS
WEIGHT: 113.84 LBS | HEART RATE: 61 BPM | BODY MASS INDEX: 21.22 KG/M2 | HEIGHT: 61.42 IN | TEMPERATURE: 98 F | DIASTOLIC BLOOD PRESSURE: 67 MMHG | OXYGEN SATURATION: 100 % | SYSTOLIC BLOOD PRESSURE: 178 MMHG

## 2020-05-21 VITALS — RESPIRATION RATE: 14 BRPM | DIASTOLIC BLOOD PRESSURE: 70 MMHG | SYSTOLIC BLOOD PRESSURE: 142 MMHG

## 2020-05-21 DIAGNOSIS — Z87.09 PERSONAL HISTORY OF OTHER DISEASES OF THE RESPIRATORY SYSTEM: ICD-10-CM

## 2020-05-21 DIAGNOSIS — R92.2 INCONCLUSIVE MAMMOGRAM: ICD-10-CM

## 2020-05-21 DIAGNOSIS — R07.81 PLEURODYNIA: ICD-10-CM

## 2020-05-21 DIAGNOSIS — Z87.898 PERSONAL HISTORY OF OTHER SPECIFIED CONDITIONS: ICD-10-CM

## 2020-05-21 DIAGNOSIS — M54.2 CERVICALGIA: ICD-10-CM

## 2020-05-21 DIAGNOSIS — J84.89 OTHER SPECIFIED INTERSTITIAL PULMONARY DISEASES: ICD-10-CM

## 2020-05-21 DIAGNOSIS — Z12.39 ENCOUNTER FOR OTHER SCREENING FOR MALIGNANT NEOPLASM OF BREAST: ICD-10-CM

## 2020-05-21 PROCEDURE — 36415 COLL VENOUS BLD VENIPUNCTURE: CPT

## 2020-05-21 PROCEDURE — 99215 OFFICE O/P EST HI 40 MIN: CPT | Mod: 25

## 2020-05-21 NOTE — ASSESSMENT
[FreeTextEntry1] : Diagnoses #1 dyspnea on exertion. To have chest x-ray to rule out any pulmonary pathology and cardiology consult for possible coronary artery disease and since with a history of heart murmur.\par #2 chronic headaches cervicogenic. 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 To start the Lopid gabapentin  100 mg p.o. at nighttime and Tylenol 500 mg p.o. q.6 hours p.r.n.\par #3 hypertension borderline. Continue same treatment and low-salt diet.\par #4 hyperlipidemia. Continue with same medication and low-fat diet\par #5 hypovitaminosis D., being supplemented\par #6 pharyngitis. Patient to be on Augmentin 875 mg p.o. every 12 hours for 8 days and drink warm liquids.\par Plan. Patient is to return after one month

## 2020-05-21 NOTE — HISTORY OF PRESENT ILLNESS
[FreeTextEntry8] : Patient comes to the office because of slight dyspnea on exertion recently without any chest pain, paroxysmal nocturnal dyspnea, orthopnea or wheezing. She has  also some sore throat mostly in the morning and yellow sputum for 3 weeks. No fever. Her  headaches persist mostly in the frontal area. She is under stress because of her 's dementia.

## 2020-05-21 NOTE — PHYSICAL EXAM
[No Acute Distress] : no acute distress [Well Nourished] : well nourished [Well Developed] : well developed [Well-Appearing] : well-appearing [Normal Voice/Communication] : normal voice/communication [Normal Sclera/Conjunctiva] : normal sclera/conjunctiva [PERRL] : pupils equal round and reactive to light [EOMI] : extraocular movements intact [Normal Outer Ear/Nose] : the outer ears and nose were normal in appearance [Normal TMs] : both tympanic membranes were normal [No JVD] : no jugular venous distention [No Lymphadenopathy] : no lymphadenopathy [Supple] : supple [Thyroid Normal, No Nodules] : the thyroid was normal and there were no nodules present [No Respiratory Distress] : no respiratory distress  [No Accessory Muscle Use] : no accessory muscle use [Clear to Auscultation] : lungs were clear to auscultation bilaterally [Normal Rate] : normal rate  [Regular Rhythm] : with a regular rhythm [Normal S1, S2] : normal S1 and S2 [No Carotid Bruits] : no carotid bruits [No Abdominal Bruit] : a ~M bruit was not heard ~T in the abdomen [No Varicosities] : no varicosities [Pedal Pulses Present] : the pedal pulses are present [No Edema] : there was no peripheral edema [No Palpable Aorta] : no palpable aorta [No Extremity Clubbing/Cyanosis] : no extremity clubbing/cyanosis [Normal Appearance] : normal in appearance [No Nipple Discharge] : no nipple discharge [No Axillary Lymphadenopathy] : no axillary lymphadenopathy [Soft] : abdomen soft [Non Tender] : non-tender [Non-distended] : non-distended [No Masses] : no abdominal mass palpated [No HSM] : no HSM [Normal Bowel Sounds] : normal bowel sounds [Normal Posterior Cervical Nodes] : no posterior cervical lymphadenopathy [Normal Anterior Cervical Nodes] : no anterior cervical lymphadenopathy [No Spinal Tenderness] : no spinal tenderness [No CVA Tenderness] : no CVA  tenderness [No Rash] : no rash [Grossly Normal Strength/Tone] : grossly normal strength/tone [No Joint Swelling] : no joint swelling [Coordination Grossly Intact] : coordination grossly intact [No Focal Deficits] : no focal deficits [No Skin Lesions] : no skin lesions [Normal Gait] : normal gait [Deep Tendon Reflexes (DTR)] : deep tendon reflexes were 2+ and symmetric [Memory Grossly Normal] : memory grossly normal [Speech Grossly Normal] : speech grossly normal [Alert and Oriented x3] : oriented to person, place, and time [Normal Affect] : the affect was normal [Normal Mood] : the mood was normal [Normal Insight/Judgement] : insight and judgment were intact [de-identified] : Postnasal drip with red pharynx [de-identified] : 2/6 sm apex;;distolic rub

## 2020-05-22 LAB
25(OH)D3 SERPL-MCNC: 54.4 NG/ML
ALBUMIN SERPL ELPH-MCNC: 4.4 G/DL
ALP BLD-CCNC: 69 U/L
ALT SERPL-CCNC: 22 U/L
ANION GAP SERPL CALC-SCNC: 12 MMOL/L
APPEARANCE: CLEAR
AST SERPL-CCNC: 34 U/L
BACTERIA: NEGATIVE
BASOPHILS # BLD AUTO: 0.08 K/UL
BASOPHILS NFR BLD AUTO: 1 %
BILIRUB SERPL-MCNC: 0.9 MG/DL
BILIRUBIN URINE: NEGATIVE
BLOOD URINE: ABNORMAL
BUN SERPL-MCNC: 22 MG/DL
CALCIUM SERPL-MCNC: 9.3 MG/DL
CHLORIDE SERPL-SCNC: 100 MMOL/L
CHOLEST SERPL-MCNC: 185 MG/DL
CHOLEST/HDLC SERPL: 2.6 RATIO
CO2 SERPL-SCNC: 28 MMOL/L
COLOR: YELLOW
CREAT SERPL-MCNC: 0.96 MG/DL
CRP SERPL-MCNC: <0.1 MG/DL
EOSINOPHIL # BLD AUTO: 0.17 K/UL
EOSINOPHIL NFR BLD AUTO: 2.2 %
ERYTHROCYTE [SEDIMENTATION RATE] IN BLOOD BY WESTERGREN METHOD: 28 MM/HR
GLUCOSE QUALITATIVE U: NEGATIVE
GLUCOSE SERPL-MCNC: 87 MG/DL
HCT VFR BLD CALC: 43.1 %
HDLC SERPL-MCNC: 70 MG/DL
HGB BLD-MCNC: 13 G/DL
HYALINE CASTS: 2 /LPF
IMM GRANULOCYTES NFR BLD AUTO: 0.3 %
KETONES URINE: NEGATIVE
LDLC SERPL CALC-MCNC: 101 MG/DL
LEUKOCYTE ESTERASE URINE: ABNORMAL
LYMPHOCYTES # BLD AUTO: 1.73 K/UL
LYMPHOCYTES NFR BLD AUTO: 22.6 %
MAN DIFF?: NORMAL
MCHC RBC-ENTMCNC: 29.5 PG
MCHC RBC-ENTMCNC: 30.2 GM/DL
MCV RBC AUTO: 97.7 FL
MICROSCOPIC-UA: NORMAL
MONOCYTES # BLD AUTO: 0.65 K/UL
MONOCYTES NFR BLD AUTO: 8.5 %
NEUTROPHILS # BLD AUTO: 5 K/UL
NEUTROPHILS NFR BLD AUTO: 65.4 %
NITRITE URINE: NEGATIVE
PH URINE: 6.5
PLATELET # BLD AUTO: 270 K/UL
POTASSIUM SERPL-SCNC: 4.2 MMOL/L
PROT SERPL-MCNC: 7.2 G/DL
PROTEIN URINE: NORMAL
RBC # BLD: 4.41 M/UL
RBC # FLD: 15.7 %
RED BLOOD CELLS URINE: 5 /HPF
SARS-COV-2 IGG SERPL IA-ACNC: 6.7 INDEX
SARS-COV-2 IGG SERPL QL IA: POSITIVE
SODIUM SERPL-SCNC: 141 MMOL/L
SPECIFIC GRAVITY URINE: 1.02
SQUAMOUS EPITHELIAL CELLS: 1 /HPF
TRIGL SERPL-MCNC: 72 MG/DL
UROBILINOGEN URINE: NORMAL
WBC # FLD AUTO: 7.65 K/UL
WHITE BLOOD CELLS URINE: 3 /HPF

## 2020-05-26 ENCOUNTER — OUTPATIENT (OUTPATIENT)
Dept: OUTPATIENT SERVICES | Facility: HOSPITAL | Age: 85
LOS: 1 days | End: 2020-05-26
Payer: MEDICARE

## 2020-05-26 ENCOUNTER — APPOINTMENT (OUTPATIENT)
Dept: RADIOLOGY | Facility: CLINIC | Age: 85
End: 2020-05-26
Payer: MEDICARE

## 2020-05-26 DIAGNOSIS — R06.00 DYSPNEA, UNSPECIFIED: ICD-10-CM

## 2020-05-26 PROCEDURE — 71046 X-RAY EXAM CHEST 2 VIEWS: CPT

## 2020-05-26 PROCEDURE — 71046 X-RAY EXAM CHEST 2 VIEWS: CPT | Mod: 26

## 2020-06-01 ENCOUNTER — APPOINTMENT (OUTPATIENT)
Dept: UROLOGY | Facility: CLINIC | Age: 85
End: 2020-06-01

## 2020-07-17 RX ORDER — AMOXICILLIN AND CLAVULANATE POTASSIUM 875; 125 MG/1; MG/1
875-125 TABLET, COATED ORAL
Qty: 16 | Refills: 0 | Status: DISCONTINUED | COMMUNITY
Start: 2020-05-21 | End: 2020-07-17

## 2020-10-13 ENCOUNTER — APPOINTMENT (OUTPATIENT)
Dept: INTERNAL MEDICINE | Facility: CLINIC | Age: 85
End: 2020-10-13
Payer: MEDICARE

## 2020-10-13 VITALS
OXYGEN SATURATION: 98 % | HEART RATE: 55 BPM | DIASTOLIC BLOOD PRESSURE: 60 MMHG | WEIGHT: 115.72 LBS | SYSTOLIC BLOOD PRESSURE: 140 MMHG | TEMPERATURE: 94.6 F | BODY MASS INDEX: 21.57 KG/M2 | HEIGHT: 61.42 IN

## 2020-10-13 VITALS — RESPIRATION RATE: 16 BRPM

## 2020-10-13 DIAGNOSIS — Z20.828 CONTACT WITH AND (SUSPECTED) EXPOSURE TO OTHER VIRAL COMMUNICABLE DISEASES: ICD-10-CM

## 2020-10-13 DIAGNOSIS — F32.9 MAJOR DEPRESSIVE DISORDER, SINGLE EPISODE, UNSPECIFIED: ICD-10-CM

## 2020-10-13 DIAGNOSIS — G89.29 HEADACHE, UNSPECIFIED: ICD-10-CM

## 2020-10-13 DIAGNOSIS — F31.81 BIPOLAR II DISORDER: ICD-10-CM

## 2020-10-13 DIAGNOSIS — Z01.419 ENCOUNTER FOR GYNECOLOGICAL EXAMINATION (GENERAL) (ROUTINE) W/OUT ABNORMAL FINDINGS: ICD-10-CM

## 2020-10-13 DIAGNOSIS — Z92.29 PERSONAL HISTORY OF OTHER DRUG THERAPY: ICD-10-CM

## 2020-10-13 DIAGNOSIS — Z87.09 PERSONAL HISTORY OF OTHER DISEASES OF THE RESPIRATORY SYSTEM: ICD-10-CM

## 2020-10-13 DIAGNOSIS — Z86.79 PERSONAL HISTORY OF OTHER DISEASES OF THE CIRCULATORY SYSTEM: ICD-10-CM

## 2020-10-13 DIAGNOSIS — R42 DIZZINESS AND GIDDINESS: ICD-10-CM

## 2020-10-13 DIAGNOSIS — R51.9 HEADACHE, UNSPECIFIED: ICD-10-CM

## 2020-10-13 PROCEDURE — 36415 COLL VENOUS BLD VENIPUNCTURE: CPT

## 2020-10-13 PROCEDURE — 99215 OFFICE O/P EST HI 40 MIN: CPT | Mod: 25

## 2020-10-13 PROCEDURE — G0008: CPT

## 2020-10-13 PROCEDURE — 90662 IIV NO PRSV INCREASED AG IM: CPT

## 2020-10-13 RX ORDER — OMEGA-3/DHA/EPA/FISH OIL 300-1000MG
400 CAPSULE ORAL
Qty: 90 | Refills: 3 | Status: ACTIVE | COMMUNITY
Start: 2020-10-13 | End: 1900-01-01

## 2020-10-13 NOTE — ASSESSMENT
[FreeTextEntry1] : Diagnoses #1 history of recent lung nodule. Patient to have a repeat chest x-ray.\par #2 history of mitral ;insufficiency /stenosis, stable. Patient is followed by the cardiologist.\par #3 hypertension,stable\par #4 hyperlipidemia, same treatment and diet\par #5 monitor liver toxicity due to chronic medication use\par #6 interstitial lung disease, stable.\par #7 hypovitaminosis D., being supplemented\par #8 history of depression, stable.Since with anxiety to restart escitalopram 5 mg p.o. once a day.\par #9 leg cramps. Patient to take magnesium  400 mg p.o. every night\par Plan. Patient advised to return after 6 months

## 2020-10-13 NOTE — PHYSICAL EXAM
[No Acute Distress] : no acute distress [Well Nourished] : well nourished [Well Developed] : well developed [Well-Appearing] : well-appearing [Normal Voice/Communication] : normal voice/communication [Normal Sclera/Conjunctiva] : normal sclera/conjunctiva [EOMI] : extraocular movements intact [Normal Outer Ear/Nose] : the outer ears and nose were normal in appearance [Normal TMs] : both tympanic membranes were normal [No JVD] : no jugular venous distention [No Lymphadenopathy] : no lymphadenopathy [Supple] : supple [Thyroid Normal, No Nodules] : the thyroid was normal and there were no nodules present [No Respiratory Distress] : no respiratory distress  [No Accessory Muscle Use] : no accessory muscle use [Clear to Auscultation] : lungs were clear to auscultation bilaterally [Normal Percussion] : the chest was normal to percussion [Normal Rate] : normal rate  [Regular Rhythm] : with a regular rhythm [Normal S1, S2] : normal S1 and S2 [No Carotid Bruits] : no carotid bruits [No Abdominal Bruit] : a ~M bruit was not heard ~T in the abdomen [No Varicosities] : no varicosities [Pedal Pulses Present] : the pedal pulses are present [No Edema] : there was no peripheral edema [No Palpable Aorta] : no palpable aorta [No Extremity Clubbing/Cyanosis] : no extremity clubbing/cyanosis [No Nipple Discharge] : no nipple discharge [No Axillary Lymphadenopathy] : no axillary lymphadenopathy [Soft] : abdomen soft [Non Tender] : non-tender [Non-distended] : non-distended [No Masses] : no abdominal mass palpated [No HSM] : no HSM [Normal Bowel Sounds] : normal bowel sounds [Normal Supraclavicular Nodes] : no supraclavicular lymphadenopathy [Normal Axillary Nodes] : no axillary lymphadenopathy [Normal Posterior Cervical Nodes] : no posterior cervical lymphadenopathy [Normal Anterior Cervical Nodes] : no anterior cervical lymphadenopathy [Normal Inguinal Nodes] : no inguinal lymphadenopathy [Normal Femoral Nodes] : no femoral lymphadenopathy [No CVA Tenderness] : no CVA  tenderness [No Spinal Tenderness] : no spinal tenderness [No Joint Swelling] : no joint swelling [Grossly Normal Strength/Tone] : grossly normal strength/tone [No Rash] : no rash [No Skin Lesions] : no skin lesions [Coordination Grossly Intact] : coordination grossly intact [No Focal Deficits] : no focal deficits [Normal Gait] : normal gait [Speech Grossly Normal] : speech grossly normal [Memory Grossly Normal] : memory grossly normal [Normal Affect] : the affect was normal [Alert and Oriented x3] : oriented to person, place, and time [Normal Mood] : the mood was normal [Normal Insight/Judgement] : insight and judgment were intact [Normal Appearance] : normal in appearance [Kyphosis] : no kyphosis [Scoliosis] : no scoliosis [de-identified] : 2/6 holosystolic murmur in the left axilla and apex

## 2020-10-13 NOTE — HISTORY OF PRESENT ILLNESS
[de-identified] : Patient comes for followup of her chronic medical problems. She feels reasonably well without any chest pain, shortness of breath, paroxysmal nocturnal dyspnea ,orthopnea. She has some slight dyspnea on exertion secondary to chronic/interstitial lung disease.She quite frequently experiences also leg cramps at night

## 2020-10-14 LAB
25(OH)D3 SERPL-MCNC: 47.3 NG/ML
ALBUMIN SERPL ELPH-MCNC: 4 G/DL
ALP BLD-CCNC: 80 U/L
ALT SERPL-CCNC: 17 U/L
ANION GAP SERPL CALC-SCNC: 12 MMOL/L
AST SERPL-CCNC: 24 U/L
BASOPHILS # BLD AUTO: 0.07 K/UL
BASOPHILS NFR BLD AUTO: 1.1 %
BILIRUB SERPL-MCNC: 0.6 MG/DL
BUN SERPL-MCNC: 15 MG/DL
CALCIUM SERPL-MCNC: 9 MG/DL
CHLORIDE SERPL-SCNC: 103 MMOL/L
CHOLEST SERPL-MCNC: 177 MG/DL
CHOLEST/HDLC SERPL: 2.6 RATIO
CO2 SERPL-SCNC: 26 MMOL/L
CREAT SERPL-MCNC: 0.73 MG/DL
EOSINOPHIL # BLD AUTO: 0.17 K/UL
EOSINOPHIL NFR BLD AUTO: 2.7 %
GLUCOSE SERPL-MCNC: 90 MG/DL
HCT VFR BLD CALC: 43.2 %
HDLC SERPL-MCNC: 68 MG/DL
HGB BLD-MCNC: 12.7 G/DL
IMM GRANULOCYTES NFR BLD AUTO: 0.3 %
LDLC SERPL CALC-MCNC: 94 MG/DL
LYMPHOCYTES # BLD AUTO: 1.44 K/UL
LYMPHOCYTES NFR BLD AUTO: 22.7 %
MAN DIFF?: NORMAL
MCHC RBC-ENTMCNC: 28.9 PG
MCHC RBC-ENTMCNC: 29.4 GM/DL
MCV RBC AUTO: 98.2 FL
MONOCYTES # BLD AUTO: 0.51 K/UL
MONOCYTES NFR BLD AUTO: 8 %
NEUTROPHILS # BLD AUTO: 4.13 K/UL
NEUTROPHILS NFR BLD AUTO: 65.2 %
PLATELET # BLD AUTO: 246 K/UL
POTASSIUM SERPL-SCNC: 4.2 MMOL/L
PROT SERPL-MCNC: 6.7 G/DL
RBC # BLD: 4.4 M/UL
RBC # FLD: 14.5 %
SODIUM SERPL-SCNC: 141 MMOL/L
TRIGL SERPL-MCNC: 78 MG/DL
WBC # FLD AUTO: 6.34 K/UL

## 2020-10-15 LAB
MAGNESIUM SERPL-MCNC: 2.2 MG/DL
T4 FREE SERPL-MCNC: 1 NG/DL
TSH SERPL-ACNC: 3.3 UIU/ML

## 2020-12-28 ENCOUNTER — RX RENEWAL (OUTPATIENT)
Age: 85
End: 2020-12-28

## 2021-01-26 ENCOUNTER — RX RENEWAL (OUTPATIENT)
Age: 86
End: 2021-01-26

## 2021-02-04 ENCOUNTER — TRANSCRIPTION ENCOUNTER (OUTPATIENT)
Age: 86
End: 2021-02-04

## 2021-04-01 ENCOUNTER — APPOINTMENT (OUTPATIENT)
Dept: INTERNAL MEDICINE | Facility: CLINIC | Age: 86
End: 2021-04-01
Payer: MEDICARE

## 2021-04-01 VITALS
DIASTOLIC BLOOD PRESSURE: 56 MMHG | TEMPERATURE: 96.6 F | HEART RATE: 61 BPM | SYSTOLIC BLOOD PRESSURE: 102 MMHG | OXYGEN SATURATION: 98 %

## 2021-04-01 VITALS — SYSTOLIC BLOOD PRESSURE: 118 MMHG | DIASTOLIC BLOOD PRESSURE: 58 MMHG | RESPIRATION RATE: 14 BRPM

## 2021-04-01 DIAGNOSIS — G47.62 SLEEP RELATED LEG CRAMPS: ICD-10-CM

## 2021-04-01 PROCEDURE — 36415 COLL VENOUS BLD VENIPUNCTURE: CPT

## 2021-04-01 PROCEDURE — 99215 OFFICE O/P EST HI 40 MIN: CPT | Mod: 25

## 2021-04-01 PROCEDURE — 99072 ADDL SUPL MATRL&STAF TM PHE: CPT

## 2021-04-01 NOTE — HISTORY OF PRESENT ILLNESS
[FreeTextEntry8] : Patient had one episode of slurred speech and imbalance yesterday afternoon which lasted approximately 5 minutes. The daughter noticed drooping of one side of her jaw. Few hours later patient had another similar episode which lasted approximately 2 minutes. This happened 1 hour after she had done the Pfizer vaccine yesterday. Patient has also headache since yesterday which is controlled well with 2 pills of Tylenol prn.Patient has a history of frequent similar headaches. Now she continues feeling some imbalance and having also hoarse voice. Patient had no chest pain, shortness of breath, palpitations, diaphoresis.Previous blood tests, images, medication list, allergies and immunizations records reviewed

## 2021-04-01 NOTE — PHYSICAL EXAM
[No Acute Distress] : no acute distress [Well Nourished] : well nourished [Well Developed] : well developed [Well-Appearing] : well-appearing [Normal Sclera/Conjunctiva] : normal sclera/conjunctiva [PERRL] : pupils equal round and reactive to light [EOMI] : extraocular movements intact [Normal Outer Ear/Nose] : the outer ears and nose were normal in appearance [Normal Oropharynx] : the oropharynx was normal [Normal TMs] : both tympanic membranes were normal [Normal Nasal Mucosa] : the nasal mucosa was normal [No JVD] : no jugular venous distention [No Lymphadenopathy] : no lymphadenopathy [Supple] : supple [Thyroid Normal, No Nodules] : the thyroid was normal and there were no nodules present [No Respiratory Distress] : no respiratory distress  [No Accessory Muscle Use] : no accessory muscle use [Clear to Auscultation] : lungs were clear to auscultation bilaterally [Normal Rate] : normal rate  [Regular Rhythm] : with a regular rhythm [Normal S1, S2] : normal S1 and S2 [No Carotid Bruits] : no carotid bruits [No Abdominal Bruit] : a ~M bruit was not heard ~T in the abdomen [No Varicosities] : no varicosities [Pedal Pulses Present] : the pedal pulses are present [No Edema] : there was no peripheral edema [No Palpable Aorta] : no palpable aorta [No Extremity Clubbing/Cyanosis] : no extremity clubbing/cyanosis [No Nipple Discharge] : no nipple discharge [No Axillary Lymphadenopathy] : no axillary lymphadenopathy [Soft] : abdomen soft [Non Tender] : non-tender [Non-distended] : non-distended [No Masses] : no abdominal mass palpated [No HSM] : no HSM [Normal Bowel Sounds] : normal bowel sounds [Normal Supraclavicular Nodes] : no supraclavicular lymphadenopathy [Normal Axillary Nodes] : no axillary lymphadenopathy [Normal Posterior Cervical Nodes] : no posterior cervical lymphadenopathy [Normal Anterior Cervical Nodes] : no anterior cervical lymphadenopathy [Normal Inguinal Nodes] : no inguinal lymphadenopathy [Normal Femoral Nodes] : no femoral lymphadenopathy [No CVA Tenderness] : no CVA  tenderness [No Spinal Tenderness] : no spinal tenderness [No Joint Swelling] : no joint swelling [Grossly Normal Strength/Tone] : grossly normal strength/tone [No Rash] : no rash [No Skin Lesions] : no skin lesions [Coordination Grossly Intact] : coordination grossly intact [Normal Affect] : the affect was normal [Deep Tendon Reflexes (DTR)] : deep tendon reflexes were 2+ and symmetric [Normal Mood] : the mood was normal [Normal Insight/Judgement] : insight and judgment were intact [de-identified] : hoarse voice [de-identified] : ;carotid bruits or heart murmur radiating there [de-identified] : 2/6 [facility]  [de-identified] : Some left face drooping.Slightly slurred and hoarse voice

## 2021-04-01 NOTE — REVIEW OF SYSTEMS
[Joint Pain] : joint pain [Unsteady Walking] : ataxia [Negative] : Heme/Lymph [FreeTextEntry9] : neck chronic [de-identified] : as above

## 2021-04-01 NOTE — ASSESSMENT
[FreeTextEntry1] : Diagnoses #1 TIA possible completed ischemic stroke.Doubt  Hemorrhagic stroke . Patient is on 81 mg ASA which I instructed to hold for 2 days until to have the MRI results  back. If  similar symptoms develop patient to go straight to the emergency room by ambulance. Have  MRI of the head and MRA of the neck and brain.\par #2 hypertension, stable. Continue with low-salt diet.cmp to lab\par #3 hyperlipidemia. Same treatment and low-fat diet. Lipid sent to lab\par #4 monitor liver toxicity due to chronic medication use. CMP to lab\par #5 hypovitaminosis D., being supplemented. Level sent to the lab.\par #6 history of pulmonary fibrosis, stable.\par #7 history of mitral ;regurgitation or stenosis ;AS . Stable. Patient advised to be followed by the cardiologist.\par #8 hoarse voice possibly secondary to a CVA. Have ENT  throat consult.\par Plan. Patient advised to return after 2 weeks. Approximately 45 minutes spent

## 2021-04-02 LAB
25(OH)D3 SERPL-MCNC: 41.7 NG/ML
ALBUMIN SERPL ELPH-MCNC: 4.1 G/DL
ALP BLD-CCNC: 70 U/L
ALT SERPL-CCNC: 14 U/L
ANION GAP SERPL CALC-SCNC: 16 MMOL/L
AST SERPL-CCNC: 23 U/L
BASOPHILS # BLD AUTO: 0.03 K/UL
BASOPHILS NFR BLD AUTO: 0.4 %
BILIRUB SERPL-MCNC: 0.6 MG/DL
BUN SERPL-MCNC: 19 MG/DL
CALCIUM SERPL-MCNC: 9 MG/DL
CHLORIDE SERPL-SCNC: 100 MMOL/L
CHOLEST SERPL-MCNC: 177 MG/DL
CO2 SERPL-SCNC: 22 MMOL/L
CREAT SERPL-MCNC: 0.93 MG/DL
EOSINOPHIL # BLD AUTO: 0.01 K/UL
EOSINOPHIL NFR BLD AUTO: 0.1 %
GLUCOSE SERPL-MCNC: 124 MG/DL
HCT VFR BLD CALC: 39.6 %
HDLC SERPL-MCNC: 65 MG/DL
HGB BLD-MCNC: 12.2 G/DL
IMM GRANULOCYTES NFR BLD AUTO: 0.4 %
LDLC SERPL CALC-MCNC: 98 MG/DL
LYMPHOCYTES # BLD AUTO: 0.69 K/UL
LYMPHOCYTES NFR BLD AUTO: 10.3 %
MAN DIFF?: NORMAL
MCHC RBC-ENTMCNC: 28.6 PG
MCHC RBC-ENTMCNC: 30.8 GM/DL
MCV RBC AUTO: 93 FL
MONOCYTES # BLD AUTO: 0.41 K/UL
MONOCYTES NFR BLD AUTO: 6.1 %
NEUTROPHILS # BLD AUTO: 5.51 K/UL
NEUTROPHILS NFR BLD AUTO: 82.7 %
NONHDLC SERPL-MCNC: 112 MG/DL
PLATELET # BLD AUTO: 242 K/UL
POTASSIUM SERPL-SCNC: 4.8 MMOL/L
PROT SERPL-MCNC: 6.5 G/DL
RBC # BLD: 4.26 M/UL
RBC # FLD: 14.3 %
SODIUM SERPL-SCNC: 138 MMOL/L
TRIGL SERPL-MCNC: 71 MG/DL
WBC # FLD AUTO: 6.68 K/UL

## 2021-04-12 ENCOUNTER — OUTPATIENT (OUTPATIENT)
Dept: OUTPATIENT SERVICES | Facility: HOSPITAL | Age: 86
LOS: 1 days | End: 2021-04-12
Payer: MEDICARE

## 2021-04-12 ENCOUNTER — RESULT REVIEW (OUTPATIENT)
Age: 86
End: 2021-04-12

## 2021-04-12 ENCOUNTER — APPOINTMENT (OUTPATIENT)
Dept: MRI IMAGING | Facility: HOSPITAL | Age: 86
End: 2021-04-12
Payer: MEDICARE

## 2021-04-12 DIAGNOSIS — G45.9 TRANSIENT CEREBRAL ISCHEMIC ATTACK, UNSPECIFIED: ICD-10-CM

## 2021-04-12 PROCEDURE — 70549 MR ANGIOGRAPH NECK W/O&W/DYE: CPT

## 2021-04-12 PROCEDURE — 70551 MRI BRAIN STEM W/O DYE: CPT

## 2021-04-12 PROCEDURE — 70546 MR ANGIOGRAPH HEAD W/O&W/DYE: CPT | Mod: 26

## 2021-04-12 PROCEDURE — 70549 MR ANGIOGRAPH NECK W/O&W/DYE: CPT | Mod: 26

## 2021-04-12 PROCEDURE — 70551 MRI BRAIN STEM W/O DYE: CPT | Mod: 26,59

## 2021-04-12 PROCEDURE — A9579: CPT

## 2021-04-12 PROCEDURE — 70546 MR ANGIOGRAPH HEAD W/O&W/DYE: CPT

## 2021-07-01 ENCOUNTER — LABORATORY RESULT (OUTPATIENT)
Age: 86
End: 2021-07-01

## 2021-07-01 ENCOUNTER — APPOINTMENT (OUTPATIENT)
Dept: INTERNAL MEDICINE | Facility: CLINIC | Age: 86
End: 2021-07-01
Payer: MEDICARE

## 2021-07-01 VITALS
HEART RATE: 58 BPM | TEMPERATURE: 97.7 F | HEIGHT: 61.42 IN | BODY MASS INDEX: 21.96 KG/M2 | OXYGEN SATURATION: 98 % | DIASTOLIC BLOOD PRESSURE: 69 MMHG | SYSTOLIC BLOOD PRESSURE: 160 MMHG | WEIGHT: 117.82 LBS

## 2021-07-01 VITALS — RESPIRATION RATE: 14 BRPM

## 2021-07-01 DIAGNOSIS — F31.81 BIPOLAR II DISORDER: ICD-10-CM

## 2021-07-01 DIAGNOSIS — Z87.898 PERSONAL HISTORY OF OTHER SPECIFIED CONDITIONS: ICD-10-CM

## 2021-07-01 DIAGNOSIS — Z86.73 PERSONAL HISTORY OF TRANSIENT ISCHEMIC ATTACK (TIA), AND CEREBRAL INFARCTION W/OUT RESIDUAL DEFICITS: ICD-10-CM

## 2021-07-01 DIAGNOSIS — Z86.59 PERSONAL HISTORY OF OTHER MENTAL AND BEHAVIORAL DISORDERS: ICD-10-CM

## 2021-07-01 DIAGNOSIS — I05.0 RHEUMATIC MITRAL STENOSIS: ICD-10-CM

## 2021-07-01 PROCEDURE — 99204 OFFICE O/P NEW MOD 45 MIN: CPT | Mod: 25

## 2021-07-01 PROCEDURE — G0439: CPT

## 2021-07-01 PROCEDURE — 99072 ADDL SUPL MATRL&STAF TM PHE: CPT

## 2021-07-01 PROCEDURE — 99214 OFFICE O/P EST MOD 30 MIN: CPT | Mod: 25

## 2021-07-01 PROCEDURE — 36415 COLL VENOUS BLD VENIPUNCTURE: CPT

## 2021-07-01 PROCEDURE — G0444 DEPRESSION SCREEN ANNUAL: CPT | Mod: 59

## 2021-07-01 NOTE — HEALTH RISK ASSESSMENT
[Good] : ~his/her~ current health as good [Poor] : ~his/her~ mood as  poor [No falls in past year] : Patient reported no falls in the past year [None] : None [Alone] : lives alone [Retired] : retired [High School] : high school [] :  [Feels Safe at Home] : Feels safe at home [Fully functional (bathing, dressing, toileting, transferring, walking, feeding)] : Fully functional (bathing, dressing, toileting, transferring, walking, feeding) [Fully functional (using the telephone, shopping, preparing meals, housekeeping, doing laundry, using] : Fully functional and needs no help or supervision to perform IADLs (using the telephone, shopping, preparing meals, housekeeping, doing laundry, using transportation, managing medications and managing finances) [Smoke Detector] : smoke detector [Carbon Monoxide Detector] : carbon monoxide detector [Safety elements used in home] : safety elements used in home [Seat Belt] :  uses seat belt [Sunscreen] : uses sunscreen [Caregiver Concerns] : has caregiver concerns [Discussed at today's visit] : Advance Directives Discussed at today's visit [FreeTextEntry1] : depression [] : No [de-identified] : socialy [Change in mental status noted] : No change in mental status noted [Language] : denies difficulty with language [Sexually Active] : not sexually active [Reports changes in hearing] : Reports no changes in hearing [Reports changes in vision] : Reports no changes in vision [Reports changes in dental health] : Reports no changes in dental health [Guns at Home] : no guns at home [Travel to Developing Areas] : does not  travel to developing areas [TB Exposure] : is not being exposed to tuberculosis
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
## 2021-07-01 NOTE — PHYSICAL EXAM
[No Acute Distress] : no acute distress [Well Nourished] : well nourished [Well Developed] : well developed [Well-Appearing] : well-appearing [Normal Voice/Communication] : normal voice/communication [Normal Sclera/Conjunctiva] : normal sclera/conjunctiva [PERRL] : pupils equal round and reactive to light [EOMI] : extraocular movements intact [Normal Outer Ear/Nose] : the outer ears and nose were normal in appearance [Normal Oropharynx] : the oropharynx was normal [Normal TMs] : both tympanic membranes were normal [Normal Nasal Mucosa] : the nasal mucosa was normal [No JVD] : no jugular venous distention [Supple] : supple [No Lymphadenopathy] : no lymphadenopathy [Thyroid Normal, No Nodules] : the thyroid was normal and there were no nodules present [No Respiratory Distress] : no respiratory distress  [Clear to Auscultation] : lungs were clear to auscultation bilaterally [No Accessory Muscle Use] : no accessory muscle use [Normal Percussion] : the chest was normal to percussion [Normal Rate] : normal rate  [Regular Rhythm] : with a regular rhythm [Normal S1, S2] : normal S1 and S2 [No Murmur] : no murmur heard [No Carotid Bruits] : no carotid bruits [No Abdominal Bruit] : a ~M bruit was not heard ~T in the abdomen [No Varicosities] : no varicosities [Pedal Pulses Present] : the pedal pulses are present [No Edema] : there was no peripheral edema [No Extremity Clubbing/Cyanosis] : no extremity clubbing/cyanosis [No Palpable Aorta] : no palpable aorta [Normal Appearance] : normal in appearance [No Nipple Discharge] : no nipple discharge [No Axillary Lymphadenopathy] : no axillary lymphadenopathy [Soft] : abdomen soft [Non Tender] : non-tender [Non-distended] : non-distended [No Masses] : no abdominal mass palpated [No HSM] : no HSM [Normal Bowel Sounds] : normal bowel sounds [No Hernias] : no hernias [Declined Rectal Exam] : declined rectal exam [Normal Supraclavicular Nodes] : no supraclavicular lymphadenopathy [Normal Axillary Nodes] : no axillary lymphadenopathy [Normal Posterior Cervical Nodes] : no posterior cervical lymphadenopathy [Normal Femoral Nodes] : no femoral lymphadenopathy [Normal Anterior Cervical Nodes] : no anterior cervical lymphadenopathy [Normal Inguinal Nodes] : no inguinal lymphadenopathy [No CVA Tenderness] : no CVA  tenderness [No Spinal Tenderness] : no spinal tenderness [No Joint Swelling] : no joint swelling [Grossly Normal Strength/Tone] : grossly normal strength/tone [No Rash] : no rash [No Skin Lesions] : no skin lesions [Normal Gait] : normal gait [Coordination Grossly Intact] : coordination grossly intact [No Focal Deficits] : no focal deficits [Deep Tendon Reflexes (DTR)] : deep tendon reflexes were 2+ and symmetric [Speech Grossly Normal] : speech grossly normal [Memory Grossly Normal] : memory grossly normal [Normal Affect] : the affect was normal [Alert and Oriented x3] : oriented to person, place, and time [Normal Mood] : the mood was normal [Normal Insight/Judgement] : insight and judgment were intact [Kyphosis] : no kyphosis [Scoliosis] : no scoliosis [de-identified] : 2/6 systolic murmur at the apex to l axilla

## 2021-07-01 NOTE — HISTORY OF PRESENT ILLNESS
[de-identified] : Patient comes for her  Medicare annual wellness visit and followup of her  chronic medical problems. Patient feels also depressed recently because of her husbands death. She has also dyspnea on exertion and occasional dry cough. No anterior chest pain, paroxysmal nocturnal dyspnea orthopnea. Seen recently by the cardiologist and started on furosemide 40 mg p.o. once a day because of mitral stenosis, aortic stenosis and hypertension.Most recent blood tests, images, medication list, allergies and immunization records reviewed

## 2021-07-07 ENCOUNTER — APPOINTMENT (OUTPATIENT)
Dept: RADIOLOGY | Facility: CLINIC | Age: 86
End: 2021-07-07
Payer: MEDICARE

## 2021-07-07 ENCOUNTER — OUTPATIENT (OUTPATIENT)
Dept: OUTPATIENT SERVICES | Facility: HOSPITAL | Age: 86
LOS: 1 days | End: 2021-07-07
Payer: MEDICARE

## 2021-07-07 DIAGNOSIS — R06.00 DYSPNEA, UNSPECIFIED: ICD-10-CM

## 2021-07-07 PROCEDURE — 71046 X-RAY EXAM CHEST 2 VIEWS: CPT

## 2021-07-07 PROCEDURE — 71046 X-RAY EXAM CHEST 2 VIEWS: CPT | Mod: 26

## 2021-07-14 LAB
25(OH)D3 SERPL-MCNC: 45.9 NG/ML
ALBUMIN SERPL ELPH-MCNC: 4.3 G/DL
ALP BLD-CCNC: 86 U/L
ALT SERPL-CCNC: 49 U/L
ANA PAT FLD IF-IMP: ABNORMAL
ANA SER IF-ACNC: ABNORMAL
ANION GAP SERPL CALC-SCNC: 11 MMOL/L
APPEARANCE: CLEAR
AST SERPL-CCNC: 55 U/L
BACTERIA: NEGATIVE
BASOPHILS # BLD AUTO: 0.07 K/UL
BASOPHILS NFR BLD AUTO: 1 %
BILIRUB SERPL-MCNC: 0.6 MG/DL
BILIRUBIN URINE: NEGATIVE
BLOOD URINE: NEGATIVE
BUN SERPL-MCNC: 17 MG/DL
CALCIUM SERPL-MCNC: 8.9 MG/DL
CHLORIDE SERPL-SCNC: 102 MMOL/L
CHOLEST SERPL-MCNC: 159 MG/DL
CK BB SERPL ELPH-CCNC: 0 % (ref 0–?)
CK MB CFR SERPL ELPH: 0 %
CK MM SERPL ELPH-CCNC: 100 %
CO2 SERPL-SCNC: 24 MMOL/L
COLOR: COLORLESS
COVID-19 SPIKE DOMAIN ANTIBODY INTERPRETATION: POSITIVE
CREAT SERPL-MCNC: 0.87 MG/DL
CREATINE KINASE,TOTAL,SERUM: 119 U/L
EOSINOPHIL # BLD AUTO: 0.23 K/UL
EOSINOPHIL NFR BLD AUTO: 3.4 %
EPO SERPL-MCNC: 39.9 MIU/ML
FERRITIN SERPL-MCNC: 31 NG/ML
FOLATE RBC-MCNC: 1242 NG/ML
GLUCOSE QUALITATIVE U: NEGATIVE
GLUCOSE SERPL-MCNC: 101 MG/DL
HAPTOGLOB SERPL-MCNC: 73 MG/DL
HBV SURFACE AB SER QL: NONREACTIVE
HBV SURFACE AG SER QL: NONREACTIVE
HCT VFR BLD CALC: 36.6 %
HCT VFR BLD CALC: 38.9 %
HCV AB SER QL: NONREACTIVE
HCV S/CO RATIO: 0.24 S/CO
HDLC SERPL-MCNC: 63 MG/DL
HGB BLD-MCNC: 11 G/DL
HYALINE CASTS: 2 /LPF
IMM GRANULOCYTES NFR BLD AUTO: 0.4 %
IRON SATN MFR SERPL: 10 %
IRON SERPL-MCNC: 48 UG/DL
KETONES URINE: NEGATIVE
LDH SERPL-CCNC: 282 U/L
LDLC SERPL CALC-MCNC: 82 MG/DL
LEUKOCYTE ESTERASE URINE: NEGATIVE
LYMPHOCYTES # BLD AUTO: 1.2 K/UL
LYMPHOCYTES NFR BLD AUTO: 17.9 %
M PROTEIN SPEC IFE-MCNC: NORMAL
MACRO TYPE 1: 0 %
MACRO TYPE 2: 0 %
MAN DIFF?: NORMAL
MCHC RBC-ENTMCNC: 27.8 PG
MCHC RBC-ENTMCNC: 30.1 GM/DL
MCV RBC AUTO: 92.4 FL
MICROSCOPIC-UA: NORMAL
MONOCYTES # BLD AUTO: 0.57 K/UL
MONOCYTES NFR BLD AUTO: 8.5 %
NEUTROPHILS # BLD AUTO: 4.59 K/UL
NEUTROPHILS NFR BLD AUTO: 68.8 %
NITRITE URINE: NEGATIVE
NONHDLC SERPL-MCNC: 96 MG/DL
PH URINE: 7.5
PLATELET # BLD AUTO: 221 K/UL
POTASSIUM SERPL-SCNC: 4.8 MMOL/L
PROT SERPL-MCNC: 7.1 G/DL
PROTEIN URINE: NEGATIVE
RBC # BLD: 3.96 M/UL
RBC # FLD: 15.1 %
RED BLOOD CELLS URINE: 3 /HPF
SARS-COV-2 AB SERPL IA-ACNC: >250 U/ML
SODIUM SERPL-SCNC: 137 MMOL/L
SPECIFIC GRAVITY URINE: 1.01
SQUAMOUS EPITHELIAL CELLS: 0 /HPF
T4 FREE SERPL-MCNC: 1 NG/DL
TIBC SERPL-MCNC: 459 UG/DL
TRIGL SERPL-MCNC: 71 MG/DL
TSH SERPL-ACNC: 3.43 UIU/ML
UIBC SERPL-MCNC: 412 UG/DL
UROBILINOGEN URINE: NORMAL
VIT B12 SERPL-MCNC: 1340 PG/ML
WBC # FLD AUTO: 6.69 K/UL
WHITE BLOOD CELLS URINE: 0 /HPF

## 2021-07-17 ENCOUNTER — RX RENEWAL (OUTPATIENT)
Age: 86
End: 2021-07-17

## 2021-10-18 ENCOUNTER — RX RENEWAL (OUTPATIENT)
Age: 86
End: 2021-10-18

## 2021-11-01 ENCOUNTER — RX RENEWAL (OUTPATIENT)
Age: 86
End: 2021-11-01

## 2021-11-02 ENCOUNTER — RX RENEWAL (OUTPATIENT)
Age: 86
End: 2021-11-02

## 2021-11-02 RX ORDER — MAGNESIUM OXIDE 241.3 MG/1000MG
400 TABLET ORAL
Qty: 90 | Refills: 3 | Status: ACTIVE | COMMUNITY
Start: 2021-11-02 | End: 1900-01-01

## 2022-01-04 ENCOUNTER — RX RENEWAL (OUTPATIENT)
Age: 87
End: 2022-01-04

## 2022-05-24 ENCOUNTER — APPOINTMENT (OUTPATIENT)
Dept: INTERNAL MEDICINE | Facility: CLINIC | Age: 87
End: 2022-05-24
Payer: MEDICARE

## 2022-05-24 VITALS — DIASTOLIC BLOOD PRESSURE: 55 MMHG | RESPIRATION RATE: 14 BRPM | SYSTOLIC BLOOD PRESSURE: 145 MMHG

## 2022-05-24 VITALS
BODY MASS INDEX: 23.57 KG/M2 | SYSTOLIC BLOOD PRESSURE: 152 MMHG | HEART RATE: 60 BPM | DIASTOLIC BLOOD PRESSURE: 56 MMHG | TEMPERATURE: 97.1 F | WEIGHT: 126.43 LBS | HEIGHT: 61.42 IN | OXYGEN SATURATION: 98 %

## 2022-05-24 DIAGNOSIS — B33.8 OTHER SPECIFIED VIRAL DISEASES: ICD-10-CM

## 2022-05-24 DIAGNOSIS — R05.9 COUGH, UNSPECIFIED: ICD-10-CM

## 2022-05-24 PROCEDURE — G0439: CPT

## 2022-05-24 PROCEDURE — 99204 OFFICE O/P NEW MOD 45 MIN: CPT | Mod: 25

## 2022-05-24 PROCEDURE — 99214 OFFICE O/P EST MOD 30 MIN: CPT | Mod: 25

## 2022-05-24 PROCEDURE — 36415 COLL VENOUS BLD VENIPUNCTURE: CPT

## 2022-05-24 RX ORDER — DENOSUMAB 60 MG/ML
60 INJECTION SUBCUTANEOUS
Qty: 1 | Refills: 0 | Status: ACTIVE | COMMUNITY
Start: 2022-01-24

## 2022-05-24 NOTE — HISTORY OF PRESENT ILLNESS
[de-identified] : Patient comes for her  Medicare annual wellness visit and followup of her  chronic medical problems. Patient feels well without any chest pain, shortness of breath, paroxysmal nocturnal dyspnea or orthopnea.  Only she experiences leg cramps at night quite frequently and the pain is intolerable.  She feels better after she gets up and walks.  She has also a lot of sweating in his neck mostly the last 4 to 5 days without other symptoms.  The last 1 to 2 days is better

## 2022-05-24 NOTE — PHYSICAL EXAM
[No Acute Distress] : no acute distress [Well Nourished] : well nourished [Well Developed] : well developed [Well-Appearing] : well-appearing [Normal Voice/Communication] : normal voice/communication [Normal Sclera/Conjunctiva] : normal sclera/conjunctiva [PERRL] : pupils equal round and reactive to light [EOMI] : extraocular movements intact [Normal Outer Ear/Nose] : the outer ears and nose were normal in appearance [Normal Oropharynx] : the oropharynx was normal [Normal TMs] : both tympanic membranes were normal [Normal Nasal Mucosa] : the nasal mucosa was normal [No JVD] : no jugular venous distention [Supple] : supple [No Lymphadenopathy] : no lymphadenopathy [Thyroid Normal, No Nodules] : the thyroid was normal and there were no nodules present [No Respiratory Distress] : no respiratory distress  [Clear to Auscultation] : lungs were clear to auscultation bilaterally [No Accessory Muscle Use] : no accessory muscle use [Normal Percussion] : the chest was normal to percussion [Normal Rate] : normal rate  [Regular Rhythm] : with a regular rhythm [Normal S1, S2] : normal S1 and S2 [No Murmur] : no murmur heard [No Carotid Bruits] : no carotid bruits [No Abdominal Bruit] : a ~M bruit was not heard ~T in the abdomen [No Varicosities] : no varicosities [Pedal Pulses Present] : the pedal pulses are present [No Edema] : there was no peripheral edema [No Extremity Clubbing/Cyanosis] : no extremity clubbing/cyanosis [No Palpable Aorta] : no palpable aorta [Normal Appearance] : normal in appearance [No Nipple Discharge] : no nipple discharge [No Axillary Lymphadenopathy] : no axillary lymphadenopathy [Soft] : abdomen soft [Non Tender] : non-tender [Non-distended] : non-distended [No Masses] : no abdominal mass palpated [No HSM] : no HSM [Normal Bowel Sounds] : normal bowel sounds [No Hernias] : no hernias [Declined Rectal Exam] : declined rectal exam [Normal Supraclavicular Nodes] : no supraclavicular lymphadenopathy [Normal Axillary Nodes] : no axillary lymphadenopathy [Normal Posterior Cervical Nodes] : no posterior cervical lymphadenopathy [Normal Femoral Nodes] : no femoral lymphadenopathy [Normal Anterior Cervical Nodes] : no anterior cervical lymphadenopathy [Normal Inguinal Nodes] : no inguinal lymphadenopathy [No CVA Tenderness] : no CVA  tenderness [No Spinal Tenderness] : no spinal tenderness [Kyphosis] : no kyphosis [Scoliosis] : no scoliosis [No Joint Swelling] : no joint swelling [Grossly Normal Strength/Tone] : grossly normal strength/tone [No Rash] : no rash [No Skin Lesions] : no skin lesions [Normal Gait] : normal gait [Coordination Grossly Intact] : coordination grossly intact [No Focal Deficits] : no focal deficits [Deep Tendon Reflexes (DTR)] : deep tendon reflexes were 2+ and symmetric [Speech Grossly Normal] : speech grossly normal [Memory Grossly Normal] : memory grossly normal [Normal Affect] : the affect was normal [Alert and Oriented x3] : oriented to person, place, and time [Normal Mood] : the mood was normal [Normal Insight/Judgement] : insight and judgment were intact [de-identified] : 2/6 systolic murmur at the apex to l axilla

## 2022-05-24 NOTE — HEALTH RISK ASSESSMENT
[Good] : ~his/her~  mood as  good [FreeTextEntry1] : health [Never] : Never [No falls in past year] : Patient reported no falls in the past year [de-identified] : socialy [Change in mental status noted] : No change in mental status noted [Language] : denies difficulty with language [None] : None [Alone] : lives alone [Retired] : retired [High School] : high school [] :  [Sexually Active] : not sexually active [Feels Safe at Home] : Feels safe at home [Fully functional (bathing, dressing, toileting, transferring, walking, feeding)] : Fully functional (bathing, dressing, toileting, transferring, walking, feeding) [Fully functional (using the telephone, shopping, preparing meals, housekeeping, doing laundry, using] : Fully functional and needs no help or supervision to perform IADLs (using the telephone, shopping, preparing meals, housekeeping, doing laundry, using transportation, managing medications and managing finances) [Reports changes in hearing] : Reports no changes in hearing [Reports changes in vision] : Reports no changes in vision [Reports changes in dental health] : Reports no changes in dental health [Smoke Detector] : smoke detector [Carbon Monoxide Detector] : carbon monoxide detector [Guns at Home] : no guns at home [Safety elements used in home] : safety elements used in home [Seat Belt] :  uses seat belt [Sunscreen] : uses sunscreen [Travel to Developing Areas] : does not  travel to developing areas [TB Exposure] : is not being exposed to tuberculosis [Caregiver Concerns] : has caregiver concerns [Discussed at today's visit] : Advance Directives Discussed at today's visit

## 2022-05-24 NOTE — ASSESSMENT
[FreeTextEntry1] : Diagnoses #1 history of pulmonary fibrosis.  Patient to have pulmonary consult\par 2.  History of lung nodule   Patient to have pulmonary consult\par #3 mitral stenosis and aortic stenosis.stable. Patient is followed by cardiologist.\par Since on furosemide 40 mg p.o. once a day CMP sent to the lab\par #4 hypertension. Blood pressure is stable  Continue current treatment and low-salt diet.  CMP to lab\par #5 hyperlipidemia, same treatment and low-fat diet. Lipids to lab\par #6 monitor liver toxicity due to chronic medication use.  CMP to lab\par #7 hypovitaminosis D., being supplemented. Level  sent to the lab.\par #8 depression.  Stable, same treatment\par 9.  History of leg cramps.  Continue with current magnesium and start tizanidine 4 mg p.o. every night.  Side effects explained to patient\par 10.  History of anemia, CBC to lab.\par 11.  Recent sweating most likely secondary to the hot weather.  Patient to have thyroid function tests\par Plan .the patient is to return after 3 months Totally approximately 65 minutes spent

## 2022-05-24 NOTE — REVIEW OF SYSTEMS
[Muscle Pain] : muscle pain [Depression] : depression [Negative] : Heme/Lymph [FreeTextEntry2] : dizziness

## 2022-05-25 LAB — T3FREE SERPL-MCNC: 2.07 PG/ML

## 2022-05-26 LAB
25(OH)D3 SERPL-MCNC: 39.2 NG/ML
ALBUMIN SERPL ELPH-MCNC: 4 G/DL
ALP BLD-CCNC: 74 U/L
ALT SERPL-CCNC: 20 U/L
ANION GAP SERPL CALC-SCNC: 12 MMOL/L
APPEARANCE: CLEAR
AST SERPL-CCNC: 24 U/L
BACTERIA: NEGATIVE
BASOPHILS # BLD AUTO: 0.07 K/UL
BASOPHILS NFR BLD AUTO: 0.7 %
BILIRUB SERPL-MCNC: 0.5 MG/DL
BILIRUBIN URINE: NEGATIVE
BLOOD URINE: NEGATIVE
BUN SERPL-MCNC: 22 MG/DL
CALCIUM SERPL-MCNC: 8.5 MG/DL
CHLORIDE SERPL-SCNC: 104 MMOL/L
CHOLEST SERPL-MCNC: 159 MG/DL
CO2 SERPL-SCNC: 23 MMOL/L
COLOR: NORMAL
COVID-19 SPIKE DOMAIN ANTIBODY INTERPRETATION: POSITIVE
CREAT SERPL-MCNC: 0.84 MG/DL
EGFR: 68 ML/MIN/1.73M2
EOSINOPHIL # BLD AUTO: 0.16 K/UL
EOSINOPHIL NFR BLD AUTO: 1.6 %
GLUCOSE QUALITATIVE U: NEGATIVE
GLUCOSE SERPL-MCNC: 99 MG/DL
HCT VFR BLD CALC: 33.5 %
HDLC SERPL-MCNC: 51 MG/DL
HGB BLD-MCNC: 9.8 G/DL
HYALINE CASTS: 0 /LPF
IMM GRANULOCYTES NFR BLD AUTO: 0.5 %
KETONES URINE: NEGATIVE
LDLC SERPL CALC-MCNC: 91 MG/DL
LEUKOCYTE ESTERASE URINE: NEGATIVE
LYMPHOCYTES # BLD AUTO: 1.26 K/UL
LYMPHOCYTES NFR BLD AUTO: 12.3 %
MAN DIFF?: NORMAL
MCHC RBC-ENTMCNC: 25.4 PG
MCHC RBC-ENTMCNC: 29.3 GM/DL
MCV RBC AUTO: 86.8 FL
MICROSCOPIC-UA: NORMAL
MONOCYTES # BLD AUTO: 0.89 K/UL
MONOCYTES NFR BLD AUTO: 8.7 %
NEUTROPHILS # BLD AUTO: 7.84 K/UL
NEUTROPHILS NFR BLD AUTO: 76.2 %
NITRITE URINE: NEGATIVE
NONHDLC SERPL-MCNC: 108 MG/DL
PH URINE: 7
PLATELET # BLD AUTO: 328 K/UL
POTASSIUM SERPL-SCNC: 4.8 MMOL/L
PROT SERPL-MCNC: 6.9 G/DL
PROTEIN URINE: NORMAL
RBC # BLD: 3.86 M/UL
RBC # FLD: 17.4 %
RED BLOOD CELLS URINE: 4 /HPF
SARS-COV-2 AB SERPL IA-ACNC: >250 U/ML
SODIUM SERPL-SCNC: 139 MMOL/L
SPECIFIC GRAVITY URINE: 1.01
SQUAMOUS EPITHELIAL CELLS: 0 /HPF
T4 FREE SERPL-MCNC: 1 NG/DL
TRIGL SERPL-MCNC: 86 MG/DL
TSH SERPL-ACNC: 2.07 UIU/ML
UROBILINOGEN URINE: NORMAL
WBC # FLD AUTO: 10.27 K/UL
WHITE BLOOD CELLS URINE: 1 /HPF

## 2022-05-31 ENCOUNTER — APPOINTMENT (OUTPATIENT)
Dept: PULMONOLOGY | Facility: CLINIC | Age: 87
End: 2022-05-31
Payer: MEDICARE

## 2022-05-31 VITALS
TEMPERATURE: 97.1 F | OXYGEN SATURATION: 97 % | WEIGHT: 122 LBS | BODY MASS INDEX: 22.74 KG/M2 | HEART RATE: 64 BPM | HEIGHT: 61.5 IN | DIASTOLIC BLOOD PRESSURE: 62 MMHG | SYSTOLIC BLOOD PRESSURE: 140 MMHG

## 2022-05-31 PROCEDURE — 94727 GAS DIL/WSHOT DETER LNG VOL: CPT

## 2022-05-31 PROCEDURE — 99203 OFFICE O/P NEW LOW 30 MIN: CPT | Mod: 25

## 2022-05-31 PROCEDURE — 94729 DIFFUSING CAPACITY: CPT

## 2022-05-31 PROCEDURE — 94060 EVALUATION OF WHEEZING: CPT

## 2022-05-31 NOTE — PROCEDURE
[FreeTextEntry1] : PFT   demonstrates normal spirometry, lung volumes and diffusion\par \par \par  [facility]

## 2022-05-31 NOTE — PHYSICAL EXAM
[No Acute Distress] : no acute distress [Well Nourished] : well nourished [Well Developed] : well developed [Normal Oropharynx] : normal oropharynx [I] : Mallampati Class: I [1+] : Right Tonsil: 1+ [Normal Appearance] : normal appearance [Supple] : supple [No Neck Mass] : no neck mass [Normal Rate/Rhythm] : normal rate/rhythm [Murmur ___ / 6] : murmur [unfilled] / 6 [No Resp Distress] : no resp distress [Clear to Auscultation Bilaterally] : clear to auscultation bilaterally [Benign] : benign [Soft] : soft [No HSM] : no hsm [No Edema] : no edema [No Focal Deficits] : no focal deficits [Oriented x3] : oriented x3 [TextBox_2] : thin,  [TextBox_54] : systolic murmur [TextBox_68] : no crackles or   rhomnchi

## 2022-05-31 NOTE — DISCUSSION/SUMMARY
[FreeTextEntry1] : 86 year old woman who reports cough, rhinitis and throat discomfort which is worse on  left side of throat\par \par She reports that she has reflux symptoms\par \par She as also had some seasonal allergy\par \par She has pulmonary HTN, aortic stenosis and is on furosemide that was started recently\par \par Her PFT does not demonstrate significant obstructive pattern\par \par She was on methotrexate in past for RA\par \par CT in past has demonstrated pleural plaques, suggestive of asbestos exposure (? history)\par \par \par I suspect her symptoms of throat discomfort are due to GERD\par \par PLAN\par \par Obtain NC CT chest to reevaluate for interstitial lung disease\par \par Refer to ENT for flexible laryngoscopy to evaluate for possible GERD versus possible lesion in her throat leading to discomfort\par \par i will treat empirically for GI symptoms, consider GI consult if unimproved.  Trial of famotidine, simethicone\par \par saline nasal wash and antihistamine\par \par Some ILD\par \par Further recommendations based on results. \par \par Arie Boswell MD

## 2022-05-31 NOTE — REVIEW OF SYSTEMS
[Nasal Congestion] : nasal congestion [Postnasal Drip] : postnasal drip [Cough] : cough [Sputum] : sputum [Dyspnea] : dyspnea [GERD] : gerd [Fever] : no fever [Chest Discomfort] : no chest discomfort [Palpitations] : no palpitations [Hay Fever] : no hay fever

## 2022-05-31 NOTE — HISTORY OF PRESENT ILLNESS
[TextBox_4] : Patient was last seen by me 6 years ago\par \par She presents due to cough, sputum production and shortness of breath \par \par She is producing copious mucus.\par \par She reports significant rhinitis, allergies and postnasal drip.\par \par \par \par She has rheumatoid arthritis and was on methotrexate in past. She is on Prolia. \par \par She has aortic stenosis and HTN.  AV area 1.3\par \par She has  history of Incidental lung nodule, > 3mm and < 8mm\par \par \par She has history of pleural plaques.\par \par She was recently started on furosemide by Dr Lopez.\par \par She has normal EF, LVH, moderate aortic stenosis\par \par CT coronary angiogram demonstrated non occlusive CAD\par \par SH never smoker\par   \par \par  [Snoring] : no snoring [Unintentional Sleep while Active] : no unintentional sleep while active

## 2022-06-03 ENCOUNTER — TRANSCRIPTION ENCOUNTER (OUTPATIENT)
Age: 87
End: 2022-06-03

## 2022-06-10 ENCOUNTER — APPOINTMENT (OUTPATIENT)
Dept: OTOLARYNGOLOGY | Facility: CLINIC | Age: 87
End: 2022-06-10
Payer: MEDICARE

## 2022-06-10 VITALS
HEART RATE: 52 BPM | BODY MASS INDEX: 22.74 KG/M2 | TEMPERATURE: 98 F | SYSTOLIC BLOOD PRESSURE: 130 MMHG | OXYGEN SATURATION: 98 % | DIASTOLIC BLOOD PRESSURE: 60 MMHG | WEIGHT: 122 LBS | HEIGHT: 61.5 IN

## 2022-06-10 PROCEDURE — 99204 OFFICE O/P NEW MOD 45 MIN: CPT | Mod: 25

## 2022-06-10 PROCEDURE — 31231 NASAL ENDOSCOPY DX: CPT

## 2022-06-10 RX ORDER — FLUTICASONE PROPIONATE 50 UG/1
50 SPRAY, METERED NASAL
Qty: 1 | Refills: 3 | Status: ACTIVE | COMMUNITY
Start: 2022-06-10 | End: 1900-01-01

## 2022-06-10 NOTE — CONSULT LETTER
[Dear  ___] : Dear  [unfilled], [( Thank you for referring [unfilled] for consultation for _____ )] : Thank you for referring [unfilled] for consultation for [unfilled] [Please see my note below.] : Please see my note below. [Consult Closing:] : Thank you very much for allowing me to participate in the care of this patient.  If you have any questions, please do not hesitate to contact me. [Sincerely,] : Sincerely, [FreeTextEntry3] : Rainer Buck MD\par Sinus & Endoscopic Skull Base Surgery\par Department of Otolaryngology- Head & Neck Surgery\par [facility]\par [facility]\par \par Phone: [phone]\par Fax: [phone]\par

## 2022-06-10 NOTE — HISTORY OF PRESENT ILLNESS
[de-identified] : 86F presents for eval of chronic cough\par Referred by Dr. Boswell\par Presents for years, tends to be worst in summer\par Started on PO antihistamine, pepcid with some mild improvement over last week\par Experiences severe coughing spells-- unclear etiology-- to the point that she is concerned about being in public\par Is planned to undergo CT chest to eval for interstitial lung disease\par Nasal congestion, alternates laterality\par No facial pain and pressure\par Smell good\par No recurrent sinus infections

## 2022-06-11 ENCOUNTER — OUTPATIENT (OUTPATIENT)
Dept: OUTPATIENT SERVICES | Facility: HOSPITAL | Age: 87
LOS: 1 days | End: 2022-06-11
Payer: MEDICARE

## 2022-06-11 ENCOUNTER — APPOINTMENT (OUTPATIENT)
Dept: CT IMAGING | Facility: CLINIC | Age: 87
End: 2022-06-11
Payer: MEDICARE

## 2022-06-11 DIAGNOSIS — Z00.8 ENCOUNTER FOR OTHER GENERAL EXAMINATION: ICD-10-CM

## 2022-06-11 DIAGNOSIS — J84.89 OTHER SPECIFIED INTERSTITIAL PULMONARY DISEASES: ICD-10-CM

## 2022-06-11 PROCEDURE — 71250 CT THORAX DX C-: CPT | Mod: 26

## 2022-06-11 PROCEDURE — 71250 CT THORAX DX C-: CPT

## 2022-06-24 ENCOUNTER — TRANSCRIPTION ENCOUNTER (OUTPATIENT)
Age: 87
End: 2022-06-24

## 2022-06-29 ENCOUNTER — TRANSCRIPTION ENCOUNTER (OUTPATIENT)
Age: 87
End: 2022-06-29

## 2022-07-01 ENCOUNTER — RX RENEWAL (OUTPATIENT)
Age: 87
End: 2022-07-01

## 2022-07-07 ENCOUNTER — RX RENEWAL (OUTPATIENT)
Age: 87
End: 2022-07-07

## 2022-07-08 ENCOUNTER — TRANSCRIPTION ENCOUNTER (OUTPATIENT)
Age: 87
End: 2022-07-08

## 2022-07-08 ENCOUNTER — RX RENEWAL (OUTPATIENT)
Age: 87
End: 2022-07-08

## 2022-07-08 DIAGNOSIS — M54.16 RADICULOPATHY, LUMBAR REGION: ICD-10-CM

## 2022-07-29 ENCOUNTER — APPOINTMENT (OUTPATIENT)
Dept: OTOLARYNGOLOGY | Facility: CLINIC | Age: 87
End: 2022-07-29

## 2022-08-05 ENCOUNTER — RX RENEWAL (OUTPATIENT)
Age: 87
End: 2022-08-05

## 2022-09-08 ENCOUNTER — RX RENEWAL (OUTPATIENT)
Age: 87
End: 2022-09-08

## 2022-10-21 ENCOUNTER — APPOINTMENT (OUTPATIENT)
Dept: INTERNAL MEDICINE | Facility: CLINIC | Age: 87
End: 2022-10-21

## 2022-11-08 ENCOUNTER — RX RENEWAL (OUTPATIENT)
Age: 87
End: 2022-11-08

## 2022-12-02 ENCOUNTER — APPOINTMENT (OUTPATIENT)
Dept: INTERNAL MEDICINE | Facility: CLINIC | Age: 87
End: 2022-12-02
Payer: MEDICARE

## 2022-12-02 VITALS — RESPIRATION RATE: 14 BRPM | SYSTOLIC BLOOD PRESSURE: 143 MMHG | DIASTOLIC BLOOD PRESSURE: 62 MMHG

## 2022-12-02 VITALS
SYSTOLIC BLOOD PRESSURE: 123 MMHG | BODY MASS INDEX: 23.11 KG/M2 | DIASTOLIC BLOOD PRESSURE: 54 MMHG | HEIGHT: 61.5 IN | TEMPERATURE: 98 F | OXYGEN SATURATION: 98 % | RESPIRATION RATE: 18 BRPM | WEIGHT: 124 LBS | HEART RATE: 68 BPM

## 2022-12-02 DIAGNOSIS — F32.A DEPRESSION, UNSPECIFIED: ICD-10-CM

## 2022-12-02 DIAGNOSIS — F31.81 BIPOLAR II DISORDER: ICD-10-CM

## 2022-12-02 DIAGNOSIS — Z87.898 PERSONAL HISTORY OF OTHER SPECIFIED CONDITIONS: ICD-10-CM

## 2022-12-02 DIAGNOSIS — M35.9 OTHER SPECIFIED INTERSTITIAL PULMONARY DISEASES: ICD-10-CM

## 2022-12-02 DIAGNOSIS — F31.81 BIPOLAR II DISORDER: Chronic | ICD-10-CM

## 2022-12-02 DIAGNOSIS — J84.89 OTHER SPECIFIED INTERSTITIAL PULMONARY DISEASES: ICD-10-CM

## 2022-12-02 DIAGNOSIS — R53.1 WEAKNESS: ICD-10-CM

## 2022-12-02 DIAGNOSIS — R06.00 DYSPNEA, UNSPECIFIED: ICD-10-CM

## 2022-12-02 DIAGNOSIS — R79.89 OTHER SPECIFIED ABNORMAL FINDINGS OF BLOOD CHEMISTRY: ICD-10-CM

## 2022-12-02 DIAGNOSIS — I34.0 NONRHEUMATIC MITRAL (VALVE) INSUFFICIENCY: ICD-10-CM

## 2022-12-02 PROCEDURE — G0008: CPT

## 2022-12-02 PROCEDURE — 99204 OFFICE O/P NEW MOD 45 MIN: CPT | Mod: 25

## 2022-12-02 PROCEDURE — 99214 OFFICE O/P EST MOD 30 MIN: CPT | Mod: 25

## 2022-12-02 PROCEDURE — 90662 IIV NO PRSV INCREASED AG IM: CPT

## 2022-12-02 PROCEDURE — G0009: CPT

## 2022-12-02 PROCEDURE — 90670 PCV13 VACCINE IM: CPT

## 2022-12-02 PROCEDURE — 36415 COLL VENOUS BLD VENIPUNCTURE: CPT

## 2022-12-02 RX ORDER — AMOXICILLIN 500 MG/1
500 CAPSULE ORAL
Qty: 21 | Refills: 0 | Status: DISCONTINUED | COMMUNITY
Start: 2022-11-14

## 2022-12-02 RX ORDER — FUROSEMIDE 40 MG/1
40 TABLET ORAL
Qty: 90 | Refills: 3 | Status: DISCONTINUED | COMMUNITY
Start: 2021-07-01 | End: 2022-12-02

## 2022-12-02 RX ORDER — ESCITALOPRAM OXALATE 10 MG/1
10 TABLET ORAL
Qty: 90 | Refills: 3 | Status: DISCONTINUED | COMMUNITY
Start: 2020-04-01 | End: 2022-12-02

## 2022-12-02 RX ORDER — IBUPROFEN 600 MG/1
600 TABLET, FILM COATED ORAL
Qty: 12 | Refills: 0 | Status: DISCONTINUED | COMMUNITY
Start: 2022-11-14

## 2022-12-02 NOTE — HISTORY OF PRESENT ILLNESS
[de-identified] : Patient comes for followup of her chronic medical problems. She feels reasonably well without any chest pain, shortness of breath, paroxysmal nocturnal dyspnea ,orthopnea. She has some dyspnea on exertion.  She is followed by the cardiologist who recently increased her frusemide to 80 mg p.o. daily.  Her appetite is fair and recently she becomes forgetful.  Most recent blood tests, images, medication list and allergies reviewed

## 2022-12-02 NOTE — ASSESSMENT
[FreeTextEntry1] : Diagnoses #1 congestive heart failure.  She has some dyspnea on exertion which is stable.  Followed up by the cardiologist.  CMP to lab\par #2 history of mitral ;insufficiency /stenosis and aortic stenosis.  Patient is followed by the cardiologist.\par #3 hypertension,stable.  CMP to lab\par #4 hyperlipidemia, same treatment and diet.  Lipids to lab\par #5 monitor liver toxicity due to chronic medication use.  CMP to lab\par #6  Lung nodules.  Remained stable and she is followed by the pulmonary doctor.\par #7 hypovitaminosis D., being supplemented.  Level to lab\par #8 history of depression, slightly depressed.  She refused to have escitalopram increased\par #9 leg cramps.stable\par 10.  History of anemia.  CBC to lab\par 11.  Demented unknown for how long.   To have metabolic work-up.  I will talk to her children about neurology consult.  MRI of the head approximately 20 months ago showed ischemic changes and atrophy\par Plan. Patient advised to return after 4 months.  Totally approximately 60 minutes spent

## 2022-12-02 NOTE — PHYSICAL EXAM
[Kyphosis] : no kyphosis [Scoliosis] : no scoliosis [de-identified] : 2/6 holosystolic murmur in the left axilla and apex [de-identified] : Patient slightly to moderately demented.  She is disoriented to time.  In addition she lost for 20 minutes in stairway coming to the office

## 2022-12-05 LAB
25(OH)D3 SERPL-MCNC: 36.6 NG/ML
ALBUMIN SERPL ELPH-MCNC: 4.4 G/DL
ALP BLD-CCNC: 65 U/L
ALT SERPL-CCNC: 13 U/L
ANION GAP SERPL CALC-SCNC: 13 MMOL/L
APPEARANCE: CLEAR
AST SERPL-CCNC: 23 U/L
BACTERIA: NEGATIVE
BASOPHILS # BLD AUTO: 0.08 K/UL
BASOPHILS NFR BLD AUTO: 1.1 %
BILIRUB SERPL-MCNC: 0.6 MG/DL
BILIRUBIN URINE: NEGATIVE
BLOOD URINE: ABNORMAL
BUN SERPL-MCNC: 26 MG/DL
CALCIUM SERPL-MCNC: 9.3 MG/DL
CHLORIDE SERPL-SCNC: 102 MMOL/L
CHOLEST SERPL-MCNC: 210 MG/DL
CO2 SERPL-SCNC: 26 MMOL/L
COLOR: YELLOW
CREAT SERPL-MCNC: 0.93 MG/DL
EGFR: 59 ML/MIN/1.73M2
EOSINOPHIL # BLD AUTO: 0.19 K/UL
EOSINOPHIL NFR BLD AUTO: 2.7 %
GLUCOSE QUALITATIVE U: NEGATIVE
GLUCOSE SERPL-MCNC: 99 MG/DL
HCT VFR BLD CALC: 33.3 %
HDLC SERPL-MCNC: 71 MG/DL
HGB BLD-MCNC: 9.9 G/DL
HYALINE CASTS: 0 /LPF
IMM GRANULOCYTES NFR BLD AUTO: 0.3 %
KETONES URINE: NEGATIVE
LDLC SERPL CALC-MCNC: 120 MG/DL
LEUKOCYTE ESTERASE URINE: NEGATIVE
LYMPHOCYTES # BLD AUTO: 1.52 K/UL
LYMPHOCYTES NFR BLD AUTO: 21.5 %
MAN DIFF?: NORMAL
MCHC RBC-ENTMCNC: 24.9 PG
MCHC RBC-ENTMCNC: 29.7 GM/DL
MCV RBC AUTO: 83.9 FL
MICROSCOPIC-UA: NORMAL
MONOCYTES # BLD AUTO: 0.7 K/UL
MONOCYTES NFR BLD AUTO: 9.9 %
NEUTROPHILS # BLD AUTO: 4.57 K/UL
NEUTROPHILS NFR BLD AUTO: 64.5 %
NITRITE URINE: NEGATIVE
NONHDLC SERPL-MCNC: 139 MG/DL
PH URINE: 6
PLATELET # BLD AUTO: 303 K/UL
POTASSIUM SERPL-SCNC: 4.5 MMOL/L
PROT SERPL-MCNC: 7.2 G/DL
PROTEIN URINE: NORMAL
RBC # BLD: 3.97 M/UL
RBC # FLD: 17.8 %
RED BLOOD CELLS URINE: 4 /HPF
SODIUM SERPL-SCNC: 141 MMOL/L
SPECIFIC GRAVITY URINE: 1.02
SQUAMOUS EPITHELIAL CELLS: 0 /HPF
TRIGL SERPL-MCNC: 95 MG/DL
UROBILINOGEN URINE: NORMAL
WBC # FLD AUTO: 7.08 K/UL
WHITE BLOOD CELLS URINE: 1 /HPF

## 2022-12-08 LAB
T PALLIDUM AB SER QL IA: NEGATIVE
T4 FREE SERPL-MCNC: 0.9 NG/DL
TSH SERPL-ACNC: 2.76 UIU/ML
VIT B12 SERPL-MCNC: 436 PG/ML

## 2022-12-14 ENCOUNTER — RX RENEWAL (OUTPATIENT)
Age: 87
End: 2022-12-14

## 2023-01-30 ENCOUNTER — INPATIENT (INPATIENT)
Facility: HOSPITAL | Age: 88
LOS: 6 days | Discharge: NURSING, REHAB OR LONG-TERM CARE | DRG: 511 | End: 2023-02-06
Attending: STUDENT IN AN ORGANIZED HEALTH CARE EDUCATION/TRAINING PROGRAM | Admitting: HOSPITALIST
Payer: MEDICARE

## 2023-01-30 VITALS
HEIGHT: 63 IN | TEMPERATURE: 98 F | HEART RATE: 59 BPM | WEIGHT: 119.93 LBS | OXYGEN SATURATION: 97 % | DIASTOLIC BLOOD PRESSURE: 63 MMHG | SYSTOLIC BLOOD PRESSURE: 96 MMHG | RESPIRATION RATE: 16 BRPM

## 2023-01-30 LAB
ALBUMIN SERPL ELPH-MCNC: 4.1 G/DL — SIGNIFICANT CHANGE UP (ref 3.3–5)
ALP SERPL-CCNC: 66 U/L — SIGNIFICANT CHANGE UP (ref 40–120)
ALT FLD-CCNC: 11 U/L — SIGNIFICANT CHANGE UP (ref 10–45)
ANION GAP SERPL CALC-SCNC: 10 MMOL/L — SIGNIFICANT CHANGE UP (ref 5–17)
APTT BLD: 28.1 SEC — SIGNIFICANT CHANGE UP (ref 27.5–35.5)
AST SERPL-CCNC: 24 U/L — SIGNIFICANT CHANGE UP (ref 10–40)
BASOPHILS # BLD AUTO: 0.08 K/UL — SIGNIFICANT CHANGE UP (ref 0–0.2)
BASOPHILS NFR BLD AUTO: 0.6 % — SIGNIFICANT CHANGE UP (ref 0–2)
BILIRUB SERPL-MCNC: 0.5 MG/DL — SIGNIFICANT CHANGE UP (ref 0.2–1.2)
BLD GP AB SCN SERPL QL: NEGATIVE — SIGNIFICANT CHANGE UP
BUN SERPL-MCNC: 25 MG/DL — HIGH (ref 7–23)
CALCIUM SERPL-MCNC: 8.7 MG/DL — SIGNIFICANT CHANGE UP (ref 8.4–10.5)
CHLORIDE SERPL-SCNC: 101 MMOL/L — SIGNIFICANT CHANGE UP (ref 96–108)
CO2 SERPL-SCNC: 25 MMOL/L — SIGNIFICANT CHANGE UP (ref 22–31)
CREAT SERPL-MCNC: 0.85 MG/DL — SIGNIFICANT CHANGE UP (ref 0.5–1.3)
EGFR: 66 ML/MIN/1.73M2 — SIGNIFICANT CHANGE UP
EOSINOPHIL # BLD AUTO: 0.1 K/UL — SIGNIFICANT CHANGE UP (ref 0–0.5)
EOSINOPHIL NFR BLD AUTO: 0.7 % — SIGNIFICANT CHANGE UP (ref 0–6)
FLUAV AG NPH QL: SIGNIFICANT CHANGE UP
FLUBV AG NPH QL: SIGNIFICANT CHANGE UP
GLUCOSE SERPL-MCNC: 116 MG/DL — HIGH (ref 70–99)
HCT VFR BLD CALC: 29.8 % — LOW (ref 34.5–45)
HGB BLD-MCNC: 9.1 G/DL — LOW (ref 11.5–15.5)
IMM GRANULOCYTES NFR BLD AUTO: 0.4 % — SIGNIFICANT CHANGE UP (ref 0–0.9)
INR BLD: 1.12 RATIO — SIGNIFICANT CHANGE UP (ref 0.88–1.16)
LYMPHOCYTES # BLD AUTO: 1.67 K/UL — SIGNIFICANT CHANGE UP (ref 1–3.3)
LYMPHOCYTES # BLD AUTO: 12.5 % — LOW (ref 13–44)
MCHC RBC-ENTMCNC: 23.9 PG — LOW (ref 27–34)
MCHC RBC-ENTMCNC: 30.5 GM/DL — LOW (ref 32–36)
MCV RBC AUTO: 78.4 FL — LOW (ref 80–100)
MONOCYTES # BLD AUTO: 1.05 K/UL — HIGH (ref 0–0.9)
MONOCYTES NFR BLD AUTO: 7.8 % — SIGNIFICANT CHANGE UP (ref 2–14)
NEUTROPHILS # BLD AUTO: 10.44 K/UL — HIGH (ref 1.8–7.4)
NEUTROPHILS NFR BLD AUTO: 78 % — HIGH (ref 43–77)
NRBC # BLD: 0 /100 WBCS — SIGNIFICANT CHANGE UP (ref 0–0)
PLATELET # BLD AUTO: 286 K/UL — SIGNIFICANT CHANGE UP (ref 150–400)
POTASSIUM SERPL-MCNC: 3.5 MMOL/L — SIGNIFICANT CHANGE UP (ref 3.5–5.3)
POTASSIUM SERPL-SCNC: 3.5 MMOL/L — SIGNIFICANT CHANGE UP (ref 3.5–5.3)
PROT SERPL-MCNC: 7.1 G/DL — SIGNIFICANT CHANGE UP (ref 6–8.3)
PROTHROM AB SERPL-ACNC: 12.9 SEC — SIGNIFICANT CHANGE UP (ref 10.5–13.4)
RBC # BLD: 3.8 M/UL — SIGNIFICANT CHANGE UP (ref 3.8–5.2)
RBC # FLD: 17.2 % — HIGH (ref 10.3–14.5)
RH IG SCN BLD-IMP: POSITIVE — SIGNIFICANT CHANGE UP
RSV RNA NPH QL NAA+NON-PROBE: SIGNIFICANT CHANGE UP
SARS-COV-2 RNA SPEC QL NAA+PROBE: SIGNIFICANT CHANGE UP
SODIUM SERPL-SCNC: 136 MMOL/L — SIGNIFICANT CHANGE UP (ref 135–145)
WBC # BLD: 13.39 K/UL — HIGH (ref 3.8–10.5)
WBC # FLD AUTO: 13.39 K/UL — HIGH (ref 3.8–10.5)

## 2023-01-30 PROCEDURE — 73110 X-RAY EXAM OF WRIST: CPT | Mod: 26,LT

## 2023-01-30 PROCEDURE — 73502 X-RAY EXAM HIP UNI 2-3 VIEWS: CPT | Mod: 26,LT

## 2023-01-30 PROCEDURE — 73030 X-RAY EXAM OF SHOULDER: CPT | Mod: 26,LT

## 2023-01-30 PROCEDURE — 99285 EMERGENCY DEPT VISIT HI MDM: CPT

## 2023-01-30 PROCEDURE — 73090 X-RAY EXAM OF FOREARM: CPT | Mod: 26,LT

## 2023-01-30 PROCEDURE — 73060 X-RAY EXAM OF HUMERUS: CPT | Mod: 26,LT

## 2023-01-30 PROCEDURE — 73070 X-RAY EXAM OF ELBOW: CPT | Mod: 26,LT

## 2023-01-30 PROCEDURE — 71045 X-RAY EXAM CHEST 1 VIEW: CPT | Mod: 26

## 2023-01-30 NOTE — ED ADULT NURSE NOTE - OBJECTIVE STATEMENT
87 y.o. F A&Ox4 presenting to ED via EMS from home for fall down stairs on plavix. Pt states that she was walking down the stairs and slipped on the last step, falling on her left side. Pt states that she did not hit her head and denies LOC. Pt daughter at bedside helping provide history states that fall was unwitnessed and family could not get to her for 1 hour. Pt states that she was unable to walk after the fall. States that she has severe pain to left elbow with movement, no pain with rest, in addition to groin pain. Denies any prior SOB, chest pain, dizziness/lightheadedness prior to or after fall. Pt denies fever/chills, H/A, lightheadedness/dizziness, vision changes, SOB, chest pain, abd pain, N/V/D, constipation, dysuria, hematuria, hematochezia, weakness, numbness, and tingling. Upon assessment, pt alert, grossly neurologically intact, and well appearing. Pupils PERRLA and no drainage noted. Airway patent, chest rise symmetrical with no advantageous L/S, and pt is eupneic. Skin is warm, dry, and normal for race. No cyanosis noted to lips or fingernails. Mucous membranes moist. Negative clubbed fingernails. Radial pulses equal and +2 bilaterally. Abd soft, nontender, nondistended. ROM intact and strength +5 in RUE and RLE Limited to LUE LLE. No edema noted to bilateral legs or arms. Swelling noted to left elbow, placed in sling by EMS. Pt resting in stretcher in position of comfort. Stretcher locked and lowered and call bell within reach.

## 2023-01-30 NOTE — ED PROVIDER NOTE - CLINICAL SUMMARY MEDICAL DECISION MAKING FREE TEXT BOX
Dustin Scott MD: 87-year-old female with past with history of CAD, CVA, HTN on Plavix who presents with left elbow and left hip pain status post mechanical fall.  Patient states that she was coming down the stairs and missed the last step causing her to fall onto her left side.  Patient states that she has pain in her elbow and exacerbation of her chronic left shoulder pain as well as some pain on the lateral left hip and was down for short period of time before being helped up by family.  Patient denies any head strike and loss of consciousness and prior to the fall had no chest pain, shortness of breath, abdominal pain, change in urination or stooling since that she woke up today in her normal state of health.    On exam patient left elbow is swollen patient with minimal range of movement secondary to pain, sensation to the bilateral hands are intact patient with 5/5  strength.  U/R/M nerves are intact and radial pulses are 2+ bilaterally.  Patient with minimal tenderness to palpation over the left humerus and left shoulder and ranging without issue.  Pain to palpation of the left greater trochanter, patient with 4/5 strength with hip flexion limited secondary to pain with 2+ bilateral DP pulses and sensation intact to the bilateral lower extremities.  Cardiac and lung exam are within normal limits and no abdominal tenderness to palpation.    Presentation concerning for acute fracture of the left elbow, possible fracture of the left hip and will obtain labs with x-ray imaging.  Low clinical suspicion for ICH given no head strike or loss of consciousness though patient is on Plavix.

## 2023-01-30 NOTE — ED PROVIDER NOTE - PROGRESS NOTE DETAILS
Jose Mendoza PGY3: XRs/CTs concerning for both Lt elbow and pelvis fracture. Pt evaluated by ortho, Lt elbow splinted; no acute surgery needed, however may discuss further intervention during admission. Pt still unable to ambulate. Will admit for PT, as unsafe discharge home at this time.

## 2023-01-30 NOTE — ED PROVIDER NOTE - ATTENDING APP SHARED VISIT CONTRIBUTION OF CARE
I, Dustin Scott, performed a history and physical exam of the patient and discussed their management with the resident and /or advanced care provider. I reviewed the resident and /or ACP's note and agree with the documented findings and plan of care. I was present and available for all procedures.    See MDM for attestation.

## 2023-01-30 NOTE — ED ADULT NURSE NOTE - TEMPLATE
Fall Purse String (Simple) Text: Given the location of the defect and the characteristics of the surrounding skin a purse string closure was deemed most appropriate.  Undermining was performed circumfirentially around the surgical defect.  A purse string suture was then placed and tightened.

## 2023-01-31 ENCOUNTER — TRANSCRIPTION ENCOUNTER (OUTPATIENT)
Age: 88
End: 2023-01-31

## 2023-01-31 DIAGNOSIS — Z29.9 ENCOUNTER FOR PROPHYLACTIC MEASURES, UNSPECIFIED: ICD-10-CM

## 2023-01-31 DIAGNOSIS — F32.89 OTHER SPECIFIED DEPRESSIVE EPISODES: ICD-10-CM

## 2023-01-31 DIAGNOSIS — R63.8 OTHER SYMPTOMS AND SIGNS CONCERNING FOOD AND FLUID INTAKE: ICD-10-CM

## 2023-01-31 DIAGNOSIS — S52.023A DISPLACED FRACTURE OF OLECRANON PROCESS WITHOUT INTRAARTICULAR EXTENSION OF UNSPECIFIED ULNA, INITIAL ENCOUNTER FOR CLOSED FRACTURE: ICD-10-CM

## 2023-01-31 DIAGNOSIS — S32.9XXA FRACTURE OF UNSPECIFIED PARTS OF LUMBOSACRAL SPINE AND PELVIS, INITIAL ENCOUNTER FOR CLOSED FRACTURE: ICD-10-CM

## 2023-01-31 DIAGNOSIS — I63.9 CEREBRAL INFARCTION, UNSPECIFIED: ICD-10-CM

## 2023-01-31 DIAGNOSIS — S32.599A OTHER SPECIFIED FRACTURE OF UNSPECIFIED PUBIS, INITIAL ENCOUNTER FOR CLOSED FRACTURE: ICD-10-CM

## 2023-01-31 DIAGNOSIS — E78.5 HYPERLIPIDEMIA, UNSPECIFIED: ICD-10-CM

## 2023-01-31 DIAGNOSIS — I25.10 ATHEROSCLEROTIC HEART DISEASE OF NATIVE CORONARY ARTERY WITHOUT ANGINA PECTORIS: ICD-10-CM

## 2023-01-31 DIAGNOSIS — I10 ESSENTIAL (PRIMARY) HYPERTENSION: ICD-10-CM

## 2023-01-31 LAB
ANION GAP SERPL CALC-SCNC: 16 MMOL/L — SIGNIFICANT CHANGE UP (ref 5–17)
APTT BLD: 26.8 SEC — LOW (ref 27.5–35.5)
BLD GP AB SCN SERPL QL: NEGATIVE — SIGNIFICANT CHANGE UP
BUN SERPL-MCNC: 24 MG/DL — HIGH (ref 7–23)
CALCIUM SERPL-MCNC: 8.7 MG/DL — SIGNIFICANT CHANGE UP (ref 8.4–10.5)
CHLORIDE SERPL-SCNC: 99 MMOL/L — SIGNIFICANT CHANGE UP (ref 96–108)
CO2 SERPL-SCNC: 21 MMOL/L — LOW (ref 22–31)
CREAT SERPL-MCNC: 0.84 MG/DL — SIGNIFICANT CHANGE UP (ref 0.5–1.3)
EGFR: 67 ML/MIN/1.73M2 — SIGNIFICANT CHANGE UP
GLUCOSE SERPL-MCNC: 86 MG/DL — SIGNIFICANT CHANGE UP (ref 70–99)
HCT VFR BLD CALC: 28.4 % — LOW (ref 34.5–45)
HGB BLD-MCNC: 8.8 G/DL — LOW (ref 11.5–15.5)
MCHC RBC-ENTMCNC: 24.2 PG — LOW (ref 27–34)
MCHC RBC-ENTMCNC: 31 GM/DL — LOW (ref 32–36)
MCV RBC AUTO: 78 FL — LOW (ref 80–100)
NRBC # BLD: 0 /100 WBCS — SIGNIFICANT CHANGE UP (ref 0–0)
PLATELET # BLD AUTO: 273 K/UL — SIGNIFICANT CHANGE UP (ref 150–400)
POTASSIUM SERPL-MCNC: 3.7 MMOL/L — SIGNIFICANT CHANGE UP (ref 3.5–5.3)
POTASSIUM SERPL-SCNC: 3.7 MMOL/L — SIGNIFICANT CHANGE UP (ref 3.5–5.3)
RBC # BLD: 3.64 M/UL — LOW (ref 3.8–5.2)
RBC # FLD: 17.4 % — HIGH (ref 10.3–14.5)
RH IG SCN BLD-IMP: POSITIVE — SIGNIFICANT CHANGE UP
SODIUM SERPL-SCNC: 136 MMOL/L — SIGNIFICANT CHANGE UP (ref 135–145)
WBC # BLD: 10.82 K/UL — HIGH (ref 3.8–10.5)
WBC # FLD AUTO: 10.82 K/UL — HIGH (ref 3.8–10.5)

## 2023-01-31 PROCEDURE — 72190 X-RAY EXAM OF PELVIS: CPT | Mod: 26

## 2023-01-31 PROCEDURE — 73070 X-RAY EXAM OF ELBOW: CPT | Mod: 26,LT

## 2023-01-31 PROCEDURE — 76377 3D RENDER W/INTRP POSTPROCES: CPT | Mod: 26

## 2023-01-31 PROCEDURE — 72192 CT PELVIS W/O DYE: CPT | Mod: 26,MA

## 2023-01-31 PROCEDURE — 99223 1ST HOSP IP/OBS HIGH 75: CPT

## 2023-01-31 RX ORDER — METOPROLOL TARTRATE 50 MG
50 TABLET ORAL DAILY
Refills: 0 | Status: DISCONTINUED | OUTPATIENT
Start: 2023-02-01 | End: 2023-02-01

## 2023-01-31 RX ORDER — SODIUM CHLORIDE 9 MG/ML
1000 INJECTION, SOLUTION INTRAVENOUS
Refills: 0 | Status: DISCONTINUED | OUTPATIENT
Start: 2023-01-31 | End: 2023-02-01

## 2023-01-31 RX ORDER — KETOROLAC TROMETHAMINE 30 MG/ML
15 SYRINGE (ML) INJECTION ONCE
Refills: 0 | Status: DISCONTINUED | OUTPATIENT
Start: 2023-01-31 | End: 2023-01-31

## 2023-01-31 RX ORDER — METHYLPREDNISOLONE 4 MG
500 TABLET ORAL DAILY
Refills: 0 | Status: DISCONTINUED | OUTPATIENT
Start: 2023-01-31 | End: 2023-02-01

## 2023-01-31 RX ORDER — ATORVASTATIN CALCIUM 80 MG/1
80 TABLET, FILM COATED ORAL AT BEDTIME
Refills: 0 | Status: DISCONTINUED | OUTPATIENT
Start: 2023-01-31 | End: 2023-02-01

## 2023-01-31 RX ORDER — ACETAMINOPHEN 500 MG
650 TABLET ORAL EVERY 6 HOURS
Refills: 0 | Status: DISCONTINUED | OUTPATIENT
Start: 2023-01-31 | End: 2023-02-01

## 2023-01-31 RX ORDER — LOSARTAN POTASSIUM 100 MG/1
1 TABLET, FILM COATED ORAL
Qty: 0 | Refills: 0 | DISCHARGE

## 2023-01-31 RX ORDER — ESCITALOPRAM OXALATE 10 MG/1
1 TABLET, FILM COATED ORAL
Qty: 0 | Refills: 0 | DISCHARGE

## 2023-01-31 RX ORDER — QUETIAPINE FUMARATE 200 MG/1
25 TABLET, FILM COATED ORAL ONCE
Refills: 0 | Status: COMPLETED | OUTPATIENT
Start: 2023-01-31 | End: 2023-01-31

## 2023-01-31 RX ORDER — ESCITALOPRAM OXALATE 10 MG/1
10 TABLET, FILM COATED ORAL DAILY
Refills: 0 | Status: DISCONTINUED | OUTPATIENT
Start: 2023-02-01 | End: 2023-02-01

## 2023-01-31 RX ORDER — ROSUVASTATIN CALCIUM 5 MG/1
1 TABLET ORAL
Qty: 0 | Refills: 0 | DISCHARGE

## 2023-01-31 RX ORDER — METOPROLOL TARTRATE 50 MG
1 TABLET ORAL
Qty: 0 | Refills: 0 | DISCHARGE

## 2023-01-31 RX ORDER — ACETAMINOPHEN 500 MG
650 TABLET ORAL ONCE
Refills: 0 | Status: COMPLETED | OUTPATIENT
Start: 2023-01-31 | End: 2023-01-31

## 2023-01-31 RX ORDER — CLOPIDOGREL BISULFATE 75 MG/1
1 TABLET, FILM COATED ORAL
Qty: 0 | Refills: 0 | DISCHARGE

## 2023-01-31 RX ADMIN — Medication 650 MILLIGRAM(S): at 00:23

## 2023-01-31 RX ADMIN — SODIUM CHLORIDE 75 MILLILITER(S): 9 INJECTION, SOLUTION INTRAVENOUS at 01:05

## 2023-01-31 RX ADMIN — Medication 15 MILLIGRAM(S): at 07:50

## 2023-01-31 RX ADMIN — SODIUM CHLORIDE 75 MILLILITER(S): 9 INJECTION, SOLUTION INTRAVENOUS at 07:56

## 2023-01-31 RX ADMIN — ATORVASTATIN CALCIUM 80 MILLIGRAM(S): 80 TABLET, FILM COATED ORAL at 22:06

## 2023-01-31 RX ADMIN — Medication 650 MILLIGRAM(S): at 16:18

## 2023-01-31 RX ADMIN — QUETIAPINE FUMARATE 25 MILLIGRAM(S): 200 TABLET, FILM COATED ORAL at 05:12

## 2023-01-31 RX ADMIN — Medication 500 MILLIGRAM(S): at 17:28

## 2023-01-31 RX ADMIN — Medication 650 MILLIGRAM(S): at 17:24

## 2023-01-31 RX ADMIN — Medication 15 MILLIGRAM(S): at 05:12

## 2023-01-31 NOTE — H&P ADULT - PROBLEM SELECTOR PLAN 1
-Patient s/p fall found to have acute displaced L olecranon fracture  -Ortho consulted, for ORIF today  -Patient>4METS  -RCRI score 1, class II risk, 6% risk of MI, cardiac arrest or death, no further workup indicated prior to OR, optimized medically, moderate risk for moderate risk surgery

## 2023-01-31 NOTE — H&P ADULT - PROBLEM SELECTOR PLAN 5
-Patient with a history of HTN, takes Furosemide 80mg daily and Losartan 50mg daily, holding for now as patient going to OR, don't want to drop BP, restart as tolerated

## 2023-01-31 NOTE — H&P ADULT - NSHPPHYSICALEXAM_GEN_ALL_CORE
.  VITAL SIGNS:  T(C): 36.6 (01-31-23 @ 12:36), Max: 37 (01-30-23 @ 20:22)  T(F): 97.9 (01-31-23 @ 12:36), Max: 98.6 (01-30-23 @ 20:22)  HR: 61 (01-31-23 @ 12:36) (57 - 65)  BP: 131/53 (01-31-23 @ 12:36) (96/63 - 174/55)  BP(mean): 70 (01-31-23 @ 11:20) (66 - 82)  RR: 18 (01-31-23 @ 12:36) (16 - 18)  SpO2: 96% (01-31-23 @ 12:36) (95% - 99%)  Wt(kg): --    PHYSICAL EXAM:    Constitutional: WDWN resting comfortably in bed; NAD  Head: NC/AT  Eyes: EOMI, anicteric sclera  ENT: no nasal discharge; uvula midline, no oropharyngeal erythema or exudates; dry mucous membranes  Respiratory: CTA B/L; no W/R/R, no retractions  Cardiac: +S1/S2; RRR; +systolic murmur;  Gastrointestinal: soft, NT/ND; no rebound or guarding  Extremities: TTP of L hip, LUE in sling and wrapped in ACE bandage  Dermatologic: skin warm, dry and intact; no rashes, wounds, or scars  Neurologic: AAOx3; CNII-XII grossly intact; no focal deficits  Psychiatric: affect and characteristics of appearance, verbalizations, behaviors are appropriate .  VITAL SIGNS:  T(C): 36.6 (01-31-23 @ 12:36), Max: 37 (01-30-23 @ 20:22)  T(F): 97.9 (01-31-23 @ 12:36), Max: 98.6 (01-30-23 @ 20:22)  HR: 61 (01-31-23 @ 12:36) (57 - 65)  BP: 131/53 (01-31-23 @ 12:36) (96/63 - 174/55)  BP(mean): 70 (01-31-23 @ 11:20) (66 - 82)  RR: 18 (01-31-23 @ 12:36) (16 - 18)  SpO2: 96% (01-31-23 @ 12:36) (95% - 99%)  Wt(kg): --    PHYSICAL EXAM:    Constitutional: WDWN resting comfortably in bed; NAD  Head: NC/AT  Eyes: EOMI, anicteric sclera  ENT: no nasal discharge; uvula midline, no oropharyngeal erythema or exudates; dry mucous membranes  Respiratory: CTA B/L; no W/R/R, no retractions  Cardiac: +S1/S2; RRR; +systolic murmur;  Gastrointestinal: soft, NT/ND; no rebound or guarding  Extremities: TTP of L hip, LUE in sling and wrapped in ACE bandage  Dermatologic: skin warm, dry and intact; no rashes, wounds, or scars  Neurologic: AAOx3  Psychiatric: affect and characteristics of appearance, verbalizations, behaviors are appropriate

## 2023-01-31 NOTE — H&P ADULT - NSICDXFAMILYHX_GEN_ALL_CORE_FT
FAMILY HISTORY:  Child  Still living? Unknown  FH: cardiovascular disease, Age at diagnosis: Age Unknown

## 2023-01-31 NOTE — ED ADULT TRIAGE NOTE - HEIGHT IN CM
Called and notified patient of results and recommendations per Calista Hickman NP.  Patient verbalized understanding and agreement with the plan to obtain a replacement machine through [facility].  The patient denied having any additional questions and follow up appointment was rescheduled.  Order was placed per verbal order from Calista.     160.02 no

## 2023-01-31 NOTE — H&P ADULT - HISTORY OF PRESENT ILLNESS
87 year old female with PMH CVA, CAD, HTN, HLD and depression who presents after a fall. The patient was in her usual state of health yesterday when she was walking down the stairs to her basement. She missed the last step and fell onto her L side. Prior to the fall, she denied any symptoms such as chest pain, shortness of breath, dizziness or lightheadedness. She complained of L hip pain and L elbow pain and was brought to the ED for evaluation. On arrival, vital signs were BP 96/63, HR 59, RR 16, temperature 98.1 degrees Farenheit and saturating 97% on room air. Imaging was significant for acute nondisplaced, mildly impacted left parasymphyseal pubic fracture and acute displaced L olecranon fracture. She was evaluated by orthopedics who recommended admission and ORIF of the L olecranon fracture.

## 2023-01-31 NOTE — H&P ADULT - PROBLEM SELECTOR PLAN 4
-Patient with a history of CVA, holding home Plavix 75mg in the setting of OR, restart per ortho recs

## 2023-01-31 NOTE — CHART NOTE - NSCHARTNOTEFT_GEN_A_CORE
Due to lack of OR availability this evening, PALLAVI MARTINEZ has now been rescheduled for tomorrow morning. Pt may eat tonight and be NPO past midnight. Due to lack of OR availability this evening, PALLAVI moody ORILORRIE has now been rescheduled for tomorrow morning (2/1/23). Pt may eat tonight and be NPO past midnight.

## 2023-01-31 NOTE — H&P ADULT - NSHPREVIEWOFSYSTEMS_GEN_ALL_CORE
REVIEW OF SYSTEMS:    CONSTITUTIONAL: No weakness, fevers or chills  EYES/ENT: No visual changes;  No vertigo or throat pain   NECK: No pain or stiffness  RESPIRATORY: No cough, wheezing, hemoptysis; No shortness of breath  CARDIOVASCULAR: No chest pain or palpitations  GASTROINTESTINAL: No abdominal or epigastric pain. No nausea, vomiting, or hematemesis; No diarrhea or constipation. No melena or hematochezia.  GENITOURINARY: No dysuria, frequency or hematuria  NEUROLOGICAL: No numbness or weakness  SKIN: No itching, burning, rashes, or lesions  MSK: L hip pain, L elbow pain  HEME: No easy bleeding, no easy bruising  All other review of systems is negative unless indicated above.

## 2023-01-31 NOTE — H&P ADULT - NSICDXPASTMEDICALHX_GEN_ALL_CORE_FT
PAST MEDICAL HISTORY:  CAD (coronary artery disease)     CVA (cerebrovascular accident)     HLD (hyperlipidemia)     HTN (hypertension)     Other depression

## 2023-01-31 NOTE — H&P ADULT - PROBLEM SELECTOR PLAN 6
-Patient with history of HLD and takes Rosuvastain 40mg daily, continue with Atorvastatin 80mg daily via therapeutic interchange -Patient with history of HLD and takes Rosuvastatin 40mg daily, continue with Atorvastatin 80mg daily via therapeutic interchange

## 2023-01-31 NOTE — H&P ADULT - PROBLEM SELECTOR PLAN 2
-Patient s/p fall found to have L pubic ramus fracture  -Will need PT after OR for ORIF of L olecranon

## 2023-01-31 NOTE — CHART NOTE - NSCHARTNOTEFT_GEN_A_CORE
given patient being admitted for inability to ambulate - would consider pursuing surgery today if medically optimized.  please document medical optimization today if appropriate  NPO  Hold DVT PPx  thank you

## 2023-01-31 NOTE — H&P ADULT - NSHPLABSRESULTS_GEN_ALL_CORE
.  LABS:                         8.8    10.82 )-----------( 273      ( 31 Jan 2023 04:16 )             28.4     01-31    136  |  99  |  24<H>  ----------------------------<  86  3.7   |  21<L>  |  0.84    Ca    8.7      31 Jan 2023 04:16    TPro  7.1  /  Alb  4.1  /  TBili  0.5  /  DBili  x   /  AST  24  /  ALT  11  /  AlkPhos  66  01-30    PT/INR - ( 31 Jan 2023 04:15 )   PT: 12.8 sec;   INR: 1.11 ratio         PTT - ( 31 Jan 2023 04:15 )  PTT:26.8 sec

## 2023-01-31 NOTE — PROVIDER CONTACT NOTE (OTHER) - BACKGROUND
pt arrived to [address] [place] [address] from ED. vitals obtained. BP diastolic is low. pt admitted for fall @ home

## 2023-01-31 NOTE — H&P ADULT - ASSESSMENT
87 year old female with PMH CVA, CAD, HTN, HLD and depression who presents after a fall found to have acute nondisplaced, mildly impacted left parasymphyseal pubic fracture and acute displaced L olecranon fracture.

## 2023-01-31 NOTE — CONSULT NOTE ADULT - SUBJECTIVE AND OBJECTIVE BOX
87y Female presents s/p fall down stairs with left olecranon fracture and left hip/pelvis pain. Patient reports she lives alone, fell down stairs today. ambulates withou assistance at baseline. Seen at bedside with her son who is her HCP/ POA - Bryon Matthewseduardo [phone].  Denies numbness/tingling in the affected extremity. Denies head strike/LOC/other orthopedic injuries at this time. Patient is RIGHT hand dominant. Patient ambulates without assistance at baseline.    PAST MEDICAL & SURGICAL HISTORY:    Home Medications:    Allergies    No Known Allergies    Intolerances                              9.1    13.39 )-----------( 286      ( 30 Jan 2023 22:35 )             29.8     01-30    136  |  101  |  25<H>  ----------------------------<  116<H>  3.5   |  25  |  0.85    Ca    8.7      30 Jan 2023 22:35    TPro  7.1  /  Alb  4.1  /  TBili  0.5  /  DBili  x   /  AST  24  /  ALT  11  /  AlkPhos  66  01-30    PT/INR - ( 30 Jan 2023 22:35 )   PT: 12.9 sec;   INR: 1.12 ratio         PTT - ( 30 Jan 2023 22:35 )  PTT:28.1 sec        Vital Signs Last 24 Hrs  T(C): 36.7 (31 Jan 2023 00:28), Max: 37 (30 Jan 2023 20:22)  T(F): 98 (31 Jan 2023 00:28), Max: 98.6 (30 Jan 2023 20:22)  HR: 65 (31 Jan 2023 00:28) (58 - 65)  BP: 129/55 (31 Jan 2023 00:28) (96/63 - 174/55)  BP(mean): 80 (31 Jan 2023 00:28) (80 - 82)  RR: 18 (31 Jan 2023 00:28) (16 - 18)  SpO2: 96% (31 Jan 2023 00:28) (96% - 99%)    Parameters below as of 31 Jan 2023 00:28  Patient On (Oxygen Delivery Method): room air        PHYSICAL EXAM  General: NAD, Awake and Alert    LUE:  Skin intact small superficial abrasion over left elbow  Significant swelling about left elbow  TTP about elbow  NTTP about shoulder/humerus distal forearm, wrist, digits  Pain with passive elbow rom  +sensation C5-T1  +nerves anterior interosseus/posterior interosseus/radial/median/ulnar/musculocutaneous  +radial pulse  Soft compartments, - calf tenderness      Secondary Exam: Benign, Skin intact, NTTP along axial spine, SILT throughout, motor grossly intact throughout, no other orthopedic injuries at this time, compartments soft and compressible    Procedure:  Procedure explained and discussed risks, benefits, and alternatives to procedure with patient at bedside. Patient expressed understanding and all questions were answered. Closed reduction was performed and a well molded plaster splint was applied. The patient tolerated the procedure well and there we no complications. The patient was neurovascularly intact following reduction. Post-reduction x-rays demonstrated acceptable alignment.    Placed in posterior slab with wings at approx 50 degrees of flexion     IMAGING:  XR : transverse / displaced left olecranon fracture   concern for pubic rami fracture on AP pel  CT : FU CT Pel / Inlet/outlet XR    Assessment/Plan:  87y Female with Left olecranon fracture - transverse with retraction    -Placed in posterior slab splint with wings  -NWB LUE in splint / sling  -Patient will require ORIF of left olecranon fracture given retraction of fracture   -Surgical intervention is not emergent and may be delayed  -However, patient may require medical admission due to inability to ambulate and is agreeable to surgery during this admission if medically clearable  -Surgical intervention may assist patient in rehabilitation of LE injury  -Concern given clinical exam for pubic rami fracture - unsure if fracture on AP pelvis alone  -FU Inlet outlet pelvis XR and CT Pelvis to rule out rami fx  -If rami fx - patient may still be WBAT on BLLE  -regardless - recommend PT for LE rehabilitation  -Please document medical optimization for Left olecranon ORIF as patient would like to have surgery if admitted   -Would be possible to do surgery as early as tomorrow afternoon if cleared by medicine team  -Please keep NPO pMN  -LR While NPO  -Hold DVT Chemo PPx  -SCDs OK  -Pain control as needed  -Will discuss with attending, and advise if plan changes

## 2023-01-31 NOTE — H&P ADULT - PROBLEM SELECTOR PLAN 3
-Patient with a history of CAD, continue Metoprolol Succinate 50mg daily and takes Rosuvastain 40mg daily, continue with Atorvastatin 80mg daily via therapeutic interchange -Patient with a history of CAD, continue Metoprolol Succinate 50mg daily and takes Rosuvastatin 40mg daily, continue with Atorvastatin 80mg daily via therapeutic interchange

## 2023-02-01 ENCOUNTER — TRANSCRIPTION ENCOUNTER (OUTPATIENT)
Age: 88
End: 2023-02-01

## 2023-02-01 LAB
ALBUMIN SERPL ELPH-MCNC: 3.3 G/DL — SIGNIFICANT CHANGE UP (ref 3.3–5)
ALP SERPL-CCNC: 55 U/L — SIGNIFICANT CHANGE UP (ref 40–120)
ALT FLD-CCNC: 8 U/L — LOW (ref 10–45)
ANION GAP SERPL CALC-SCNC: 10 MMOL/L — SIGNIFICANT CHANGE UP (ref 5–17)
AST SERPL-CCNC: 19 U/L — SIGNIFICANT CHANGE UP (ref 10–40)
BILIRUB SERPL-MCNC: 0.6 MG/DL — SIGNIFICANT CHANGE UP (ref 0.2–1.2)
BUN SERPL-MCNC: 23 MG/DL — SIGNIFICANT CHANGE UP (ref 7–23)
CALCIUM SERPL-MCNC: 8.2 MG/DL — LOW (ref 8.4–10.5)
CHLORIDE SERPL-SCNC: 104 MMOL/L — SIGNIFICANT CHANGE UP (ref 96–108)
CO2 SERPL-SCNC: 24 MMOL/L — SIGNIFICANT CHANGE UP (ref 22–31)
CREAT SERPL-MCNC: 1.01 MG/DL — SIGNIFICANT CHANGE UP (ref 0.5–1.3)
EGFR: 54 ML/MIN/1.73M2 — LOW
GLUCOSE SERPL-MCNC: 94 MG/DL — SIGNIFICANT CHANGE UP (ref 70–99)
HCT VFR BLD CALC: 25.7 % — LOW (ref 34.5–45)
HGB BLD-MCNC: 7.8 G/DL — LOW (ref 11.5–15.5)
INR BLD: 1.14 RATIO — SIGNIFICANT CHANGE UP (ref 0.88–1.16)
MAGNESIUM SERPL-MCNC: 2.2 MG/DL — SIGNIFICANT CHANGE UP (ref 1.6–2.6)
MCHC RBC-ENTMCNC: 23.6 PG — LOW (ref 27–34)
MCHC RBC-ENTMCNC: 30.4 GM/DL — LOW (ref 32–36)
MCV RBC AUTO: 77.9 FL — LOW (ref 80–100)
NRBC # BLD: 0 /100 WBCS — SIGNIFICANT CHANGE UP (ref 0–0)
PHOSPHATE SERPL-MCNC: 3.1 MG/DL — SIGNIFICANT CHANGE UP (ref 2.5–4.5)
PLATELET # BLD AUTO: 233 K/UL — SIGNIFICANT CHANGE UP (ref 150–400)
POTASSIUM SERPL-MCNC: 4.1 MMOL/L — SIGNIFICANT CHANGE UP (ref 3.5–5.3)
POTASSIUM SERPL-SCNC: 4.1 MMOL/L — SIGNIFICANT CHANGE UP (ref 3.5–5.3)
PROT SERPL-MCNC: 5.9 G/DL — LOW (ref 6–8.3)
PROTHROM AB SERPL-ACNC: 13.2 SEC — SIGNIFICANT CHANGE UP (ref 10.5–13.4)
RBC # BLD: 3.3 M/UL — LOW (ref 3.8–5.2)
RBC # FLD: 17.3 % — HIGH (ref 10.3–14.5)
SODIUM SERPL-SCNC: 138 MMOL/L — SIGNIFICANT CHANGE UP (ref 135–145)
WBC # BLD: 9.52 K/UL — SIGNIFICANT CHANGE UP (ref 3.8–10.5)
WBC # FLD AUTO: 9.52 K/UL — SIGNIFICANT CHANGE UP (ref 3.8–10.5)

## 2023-02-01 PROCEDURE — 27197 CLSD TX PELVIC RING FX: CPT

## 2023-02-01 PROCEDURE — 99232 SBSQ HOSP IP/OBS MODERATE 35: CPT

## 2023-02-01 PROCEDURE — 24685 OPTX ULNAR FX PROX END W/FIX: CPT | Mod: LT

## 2023-02-01 DEVICE — SCREW CORTEX S-T 2.7X50MM: Type: IMPLANTABLE DEVICE | Site: LEFT | Status: FUNCTIONAL

## 2023-02-01 DEVICE — IMPLANTABLE DEVICE: Type: IMPLANTABLE DEVICE | Site: LEFT | Status: FUNCTIONAL

## 2023-02-01 DEVICE — SCREW CORTEX S-T 2.7X20MM: Type: IMPLANTABLE DEVICE | Site: LEFT | Status: FUNCTIONAL

## 2023-02-01 DEVICE — SCREW LOKG S-T W/ T8 STARDRIVE RECESS 2.7X20MM: Type: IMPLANTABLE DEVICE | Site: LEFT | Status: FUNCTIONAL

## 2023-02-01 DEVICE — SCREW LOKG S-T W/ T8 STARDRIVE RECESS 2.7X18MM: Type: IMPLANTABLE DEVICE | Site: LEFT | Status: FUNCTIONAL

## 2023-02-01 DEVICE — SCREW CORTEX S-T 2.7X26MM: Type: IMPLANTABLE DEVICE | Site: LEFT | Status: FUNCTIONAL

## 2023-02-01 DEVICE — KWIRE TRC PT 2X150MM: Type: IMPLANTABLE DEVICE | Site: LEFT | Status: FUNCTIONAL

## 2023-02-01 DEVICE — KIT A-LINE 1LUM 20G X 12CM SAFE KIT: Type: IMPLANTABLE DEVICE | Site: LEFT | Status: FUNCTIONAL

## 2023-02-01 DEVICE — K-WIRE SYNTHES (THREADED) TROCAR POINT 1.6MM X 150MM: Type: IMPLANTABLE DEVICE | Site: LEFT | Status: FUNCTIONAL

## 2023-02-01 DEVICE — SCREW LOKG S-T W/ T8 STARDRIVE RECESS 2.7X14MM: Type: IMPLANTABLE DEVICE | Site: LEFT | Status: FUNCTIONAL

## 2023-02-01 DEVICE — SCREW CORTEX S-T 2.7X30MM: Type: IMPLANTABLE DEVICE | Site: LEFT | Status: FUNCTIONAL

## 2023-02-01 DEVICE — PLATE LCP CONDYLAR 7H SHAFT 2.7MM: Type: IMPLANTABLE DEVICE | Site: LEFT | Status: FUNCTIONAL

## 2023-02-01 RX ORDER — CEFAZOLIN SODIUM 1 G
2000 VIAL (EA) INJECTION EVERY 8 HOURS
Refills: 0 | Status: COMPLETED | OUTPATIENT
Start: 2023-02-01 | End: 2023-02-02

## 2023-02-01 RX ORDER — OXYCODONE HYDROCHLORIDE 5 MG/1
5 TABLET ORAL EVERY 4 HOURS
Refills: 0 | Status: DISCONTINUED | OUTPATIENT
Start: 2023-02-01 | End: 2023-02-02

## 2023-02-01 RX ORDER — ACETAMINOPHEN 500 MG
650 TABLET ORAL EVERY 6 HOURS
Refills: 0 | Status: DISCONTINUED | OUTPATIENT
Start: 2023-02-01 | End: 2023-02-06

## 2023-02-01 RX ORDER — ONDANSETRON 8 MG/1
4 TABLET, FILM COATED ORAL ONCE
Refills: 0 | Status: DISCONTINUED | OUTPATIENT
Start: 2023-02-01 | End: 2023-02-01

## 2023-02-01 RX ORDER — ONDANSETRON 8 MG/1
4 TABLET, FILM COATED ORAL EVERY 6 HOURS
Refills: 0 | Status: DISCONTINUED | OUTPATIENT
Start: 2023-02-01 | End: 2023-02-06

## 2023-02-01 RX ORDER — SODIUM CHLORIDE 9 MG/ML
1000 INJECTION, SOLUTION INTRAVENOUS
Refills: 0 | Status: DISCONTINUED | OUTPATIENT
Start: 2023-02-01 | End: 2023-02-01

## 2023-02-01 RX ORDER — HYDROMORPHONE HYDROCHLORIDE 2 MG/ML
0.25 INJECTION INTRAMUSCULAR; INTRAVENOUS; SUBCUTANEOUS
Refills: 0 | Status: DISCONTINUED | OUTPATIENT
Start: 2023-02-01 | End: 2023-02-01

## 2023-02-01 RX ORDER — ENOXAPARIN SODIUM 100 MG/ML
40 INJECTION SUBCUTANEOUS EVERY 24 HOURS
Refills: 0 | Status: DISCONTINUED | OUTPATIENT
Start: 2023-02-01 | End: 2023-02-06

## 2023-02-01 RX ORDER — OXYCODONE HYDROCHLORIDE 5 MG/1
2.5 TABLET ORAL EVERY 4 HOURS
Refills: 0 | Status: DISCONTINUED | OUTPATIENT
Start: 2023-02-01 | End: 2023-02-02

## 2023-02-01 RX ADMIN — SODIUM CHLORIDE 75 MILLILITER(S): 9 INJECTION, SOLUTION INTRAVENOUS at 17:22

## 2023-02-01 RX ADMIN — Medication 500 MILLIGRAM(S): at 11:13

## 2023-02-01 RX ADMIN — SODIUM CHLORIDE 75 MILLILITER(S): 9 INJECTION, SOLUTION INTRAVENOUS at 01:56

## 2023-02-01 RX ADMIN — Medication 100 MILLIGRAM(S): at 21:10

## 2023-02-01 RX ADMIN — Medication 650 MILLIGRAM(S): at 08:54

## 2023-02-01 RX ADMIN — ENOXAPARIN SODIUM 40 MILLIGRAM(S): 100 INJECTION SUBCUTANEOUS at 21:10

## 2023-02-01 RX ADMIN — Medication 650 MILLIGRAM(S): at 08:24

## 2023-02-01 RX ADMIN — ESCITALOPRAM OXALATE 10 MILLIGRAM(S): 10 TABLET, FILM COATED ORAL at 11:13

## 2023-02-01 NOTE — PROGRESS NOTE ADULT - SUBJECTIVE AND OBJECTIVE BOX
Patient seen and examined at bedside. Pain well controlled with medication. Patient denies any numbness, tingling, weakness, or any other orthopaedic complaint.     LABS:                        7.8    9.52  )-----------( 233      ( 01 Feb 2023 02:55 )             25.7     02-01    138  |  104  |  23  ----------------------------<  94  4.1   |  24  |  1.01    Ca    8.2<L>      01 Feb 2023 02:55  Phos  3.1     02-01  Mg     2.2     02-01    TPro  5.9<L>  /  Alb  3.3  /  TBili  0.6  /  DBili  x   /  AST  19  /  ALT  8<L>  /  AlkPhos  55  02-01    PT/INR - ( 31 Jan 2023 04:15 )   PT: 12.8 sec;   INR: 1.11 ratio         PTT - ( 31 Jan 2023 04:15 )  PTT:26.8 sec      VITAL SIGNS:  T(C): 36.8 (02-01-23 @ 04:58), Max: 37.3 (02-01-23 @ 04:40)  HR: 71 (02-01-23 @ 04:58) (57 - 76)  BP: 137/47 (02-01-23 @ 04:58) (103/51 - 151/65)  RR: 17 (02-01-23 @ 04:58) (17 - 18)  SpO2: 93% (02-01-23 @ 04:58) (93% - 99%)      PHYSICAL EXAM  General: NAD, Awake and Alert    LUE:  Splint in place  TTP about elbow  NTTP about shoulder/humerus distal forearm, wrist, digits  +sensation C5-T1  +nerves anterior interosseus/posterior interosseus/ulnar  +radial pulse  Soft compartments, BL NTTP calf tenderness    87y Female with Left olecranon fracture - transverse with retraction    -Placed in posterior slab splint with wings  -NWB LUE in splint / sling  -NPO  -LR While NPO  -DVT ppx SCDs, Hold DVT Chemo PPx  -Will discuss with attending. Will advise if plan changes.

## 2023-02-01 NOTE — BRIEF OPERATIVE NOTE - NSICDXBRIEFPROCEDURE_GEN_ALL_CORE_FT
PROCEDURES:  Open reduction and internal fixation of fracture of left olecranon 01-Feb-2023 15:38:12  Bryan Mccarthy

## 2023-02-01 NOTE — CHART NOTE - NSCHARTNOTEFT_GEN_A_CORE
Lovenox 40 mg ordered for DVT prophylaxis as recommended by Dr Pearson.   Dee Dee Washington NP  [phone]

## 2023-02-01 NOTE — PROGRESS NOTE ADULT - SUBJECTIVE AND OBJECTIVE BOX
[facility] Division of Hospital Medicine  Cem Pearson MD  Contact M-F, 8A-5P through [employer] Teams  Other Times:  [phone]    Patient is a 87y old  Female who presents with a chief complaint of L olecranon fracture (01 Feb 2023 07:00)    SUBJECTIVE / OVERNIGHT EVENTS: no events, daughter at bedside, pt is waiting for ortho intervention  ADDITIONAL REVIEW OF SYSTEMS:    MEDICATIONS  (STANDING):    MEDICATIONS  (PRN):    CAPILLARY BLOOD GLUCOSE        I&O's Summary    31 Jan 2023 07:01  -  01 Feb 2023 07:00  --------------------------------------------------------  IN: 825 mL / OUT: 0 mL / NET: 825 mL    01 Feb 2023 07:01  -  01 Feb 2023 15:16  --------------------------------------------------------  IN: 0 mL / OUT: 0 mL / NET: 0 mL        PHYSICAL EXAM:  Vital Signs Last 24 Hrs  T(C): 36.6 (01 Feb 2023 13:49), Max: 37.3 (01 Feb 2023 04:40)  T(F): 97.9 (01 Feb 2023 12:43), Max: 99.1 (01 Feb 2023 04:40)  HR: 80 (01 Feb 2023 13:49) (65 - 80)  BP: 177/69 (01 Feb 2023 13:49) (105/45 - 177/69)  BP(mean): 70 (01 Feb 2023 13:49) (70 - 70)  RR: 16 (01 Feb 2023 13:49) (16 - 18)  SpO2: 96% (01 Feb 2023 13:49) (93% - 96%)    Parameters below as of 01 Feb 2023 12:43  Patient On (Oxygen Delivery Method): room air      Constitutional: WDWN resting comfortably in bed; NAD  Head: NC/AT  Eyes: EOMI, anicteric sclera  ENT: no nasal discharge; uvula midline, no oropharyngeal erythema or exudates; dry mucous membranes  Respiratory: CTA B/L; no W/R/R, no retractions  Cardiac: +S1/S2; RRR;   Gastrointestinal: soft, NT/ND; no rebound or guarding  Extremities: TTP of L hip, LUE in sling and wrapped in ACE bandage  SKIN: skin warm, dry and intact; no rashes, wounds, or scars  Neurologic: AAOx3  Psychiatric: affect and characteristics of appearance, verbalizations, behaviors are appropriate    LABS:                        7.8    9.52  )-----------( 233      ( 01 Feb 2023 02:55 )             25.7     02-01    138  |  104  |  23  ----------------------------<  94  4.1   |  24  |  1.01    Ca    8.2<L>      01 Feb 2023 02:55  Phos  3.1     02-01  Mg     2.2     02-01    TPro  5.9<L>  /  Alb  3.3  /  TBili  0.6  /  DBili  x   /  AST  19  /  ALT  8<L>  /  AlkPhos  55  02-01    PT/INR - ( 01 Feb 2023 10:43 )   PT: 13.2 sec;   INR: 1.14 ratio         PTT - ( 31 Jan 2023 04:15 )  PTT:26.8 sec              RADIOLOGY & ADDITIONAL TESTS:  Results Reviewed:   Imaging Personally Reviewed:  Electrocardiogram Personally Reviewed:    COORDINATION OF CARE:  Care Discussed with Consultants/Other Providers [Y/N]:  Prior or Outpatient Records Reviewed [Y/N]:

## 2023-02-01 NOTE — CHART NOTE - NSCHARTNOTEFT_GEN_A_CORE
Post-Operative Check    Pt seen and evaluated at bedside resting without acute complaints.  As per pt, splint feels comfortable. Patient admits pain is well tolerated.   Denies CP/SOB, dyspnea, paresthesias    Vitals:  T(C): 36.6 (01 Feb 2023 18:19), Max: 37.3 (01 Feb 2023 04:40)  T(F): 97.8 (01 Feb 2023 18:19), Max: 99.1 (01 Feb 2023 04:40)  HR: 79 (01 Feb 2023 18:19) (65 - 88)  BP: 132/61 (01 Feb 2023 18:19) (105/45 - 177/69)  BP(mean): 102 (01 Feb 2023 17:15) (70 - 102)  RR: 18 (01 Feb 2023 18:19) (16 - 20)  SpO2: 95% (01 Feb 2023 18:19) (93% - 99%)    Parameters below as of 01 Feb 2023 18:19  Patient On (Oxygen Delivery Method): room air    Exam:  General: alert and orientated, no acute distress  Extremity: LUE     Ace/post slab are clean, dry, and intact     Digits are pink, warm with sensation intact to light touch.      No pain with PROM of digits     AIN/ulnar/median/radial nerves intact     Strong  strength      2+ radial pulse     Cap refill <2secs    Calves are soft, nontender bilaterally     A/P: 87y Female s/p ORIF of Left olecranon  -  Pain management  -  IS encouraged  -  Ice/elevation  -  DVT ppx: per primary team  -  PT/OT - WBAT BL LE and WBAT L UE with platform walker   -  f/u AM labs  -  Continue with post-op abx x24hrs  -  Continue current Tx per primary team  -  Discharge: per primary team    Nilay Herron PA-C  Orthopedic Surgery  Pager: [phone]

## 2023-02-01 NOTE — PRE-ANESTHESIA EVALUATION ADULT - NSANTHOSAYNRD_GEN_A_CORE
No. BRENDEN screening performed.  STOP BANG Legend: 0-2 = LOW Risk; 3-4 = INTERMEDIATE Risk; 5-8 = HIGH Risk

## 2023-02-02 LAB
ANION GAP SERPL CALC-SCNC: 11 MMOL/L — SIGNIFICANT CHANGE UP (ref 5–17)
BASOPHILS # BLD AUTO: 0.01 K/UL — SIGNIFICANT CHANGE UP (ref 0–0.2)
BASOPHILS NFR BLD AUTO: 0.1 % — SIGNIFICANT CHANGE UP (ref 0–2)
BUN SERPL-MCNC: 19 MG/DL — SIGNIFICANT CHANGE UP (ref 7–23)
CALCIUM SERPL-MCNC: 7.9 MG/DL — LOW (ref 8.4–10.5)
CHLORIDE SERPL-SCNC: 104 MMOL/L — SIGNIFICANT CHANGE UP (ref 96–108)
CO2 SERPL-SCNC: 23 MMOL/L — SIGNIFICANT CHANGE UP (ref 22–31)
CREAT SERPL-MCNC: 0.99 MG/DL — SIGNIFICANT CHANGE UP (ref 0.5–1.3)
EGFR: 55 ML/MIN/1.73M2 — LOW
EOSINOPHIL # BLD AUTO: 0.01 K/UL — SIGNIFICANT CHANGE UP (ref 0–0.5)
EOSINOPHIL NFR BLD AUTO: 0.1 % — SIGNIFICANT CHANGE UP (ref 0–6)
GLUCOSE SERPL-MCNC: 151 MG/DL — HIGH (ref 70–99)
HCT VFR BLD CALC: 34.4 % — LOW (ref 34.5–45)
HGB BLD-MCNC: 10.7 G/DL — LOW (ref 11.5–15.5)
IMM GRANULOCYTES NFR BLD AUTO: 0.7 % — SIGNIFICANT CHANGE UP (ref 0–0.9)
LYMPHOCYTES # BLD AUTO: 0.91 K/UL — LOW (ref 1–3.3)
LYMPHOCYTES # BLD AUTO: 8.4 % — LOW (ref 13–44)
MCHC RBC-ENTMCNC: 25.3 PG — LOW (ref 27–34)
MCHC RBC-ENTMCNC: 31.1 GM/DL — LOW (ref 32–36)
MCV RBC AUTO: 81.3 FL — SIGNIFICANT CHANGE UP (ref 80–100)
MONOCYTES # BLD AUTO: 0.79 K/UL — SIGNIFICANT CHANGE UP (ref 0–0.9)
MONOCYTES NFR BLD AUTO: 7.3 % — SIGNIFICANT CHANGE UP (ref 2–14)
NEUTROPHILS # BLD AUTO: 9.06 K/UL — HIGH (ref 1.8–7.4)
NEUTROPHILS NFR BLD AUTO: 83.4 % — HIGH (ref 43–77)
NRBC # BLD: 0 /100 WBCS — SIGNIFICANT CHANGE UP (ref 0–0)
PLATELET # BLD AUTO: 217 K/UL — SIGNIFICANT CHANGE UP (ref 150–400)
POTASSIUM SERPL-MCNC: 3.8 MMOL/L — SIGNIFICANT CHANGE UP (ref 3.5–5.3)
POTASSIUM SERPL-SCNC: 3.8 MMOL/L — SIGNIFICANT CHANGE UP (ref 3.5–5.3)
RBC # BLD: 4.23 M/UL — SIGNIFICANT CHANGE UP (ref 3.8–5.2)
RBC # FLD: 18.2 % — HIGH (ref 10.3–14.5)
SODIUM SERPL-SCNC: 138 MMOL/L — SIGNIFICANT CHANGE UP (ref 135–145)
WBC # BLD: 10.86 K/UL — HIGH (ref 3.8–10.5)
WBC # FLD AUTO: 10.86 K/UL — HIGH (ref 3.8–10.5)

## 2023-02-02 PROCEDURE — 99232 SBSQ HOSP IP/OBS MODERATE 35: CPT

## 2023-02-02 RX ORDER — CHLORHEXIDINE GLUCONATE 213 G/1000ML
1 SOLUTION TOPICAL DAILY
Refills: 0 | Status: DISCONTINUED | OUTPATIENT
Start: 2023-02-02 | End: 2023-02-06

## 2023-02-02 RX ORDER — TRAMADOL HYDROCHLORIDE 50 MG/1
25 TABLET ORAL
Refills: 0 | Status: DISCONTINUED | OUTPATIENT
Start: 2023-02-02 | End: 2023-02-06

## 2023-02-02 RX ORDER — LOSARTAN POTASSIUM 100 MG/1
50 TABLET, FILM COATED ORAL DAILY
Refills: 0 | Status: DISCONTINUED | OUTPATIENT
Start: 2023-02-02 | End: 2023-02-06

## 2023-02-02 RX ORDER — LANOLIN ALCOHOL/MO/W.PET/CERES
1 CREAM (GRAM) TOPICAL AT BEDTIME
Refills: 0 | Status: DISCONTINUED | OUTPATIENT
Start: 2023-02-02 | End: 2023-02-06

## 2023-02-02 RX ADMIN — Medication 100 MILLIGRAM(S): at 05:02

## 2023-02-02 RX ADMIN — Medication 650 MILLIGRAM(S): at 18:32

## 2023-02-02 RX ADMIN — ENOXAPARIN SODIUM 40 MILLIGRAM(S): 100 INJECTION SUBCUTANEOUS at 21:21

## 2023-02-02 RX ADMIN — CHLORHEXIDINE GLUCONATE 1 APPLICATION(S): 213 SOLUTION TOPICAL at 11:19

## 2023-02-02 RX ADMIN — OXYCODONE HYDROCHLORIDE 5 MILLIGRAM(S): 5 TABLET ORAL at 02:02

## 2023-02-02 RX ADMIN — Medication 0.5 MILLIGRAM(S): at 19:37

## 2023-02-02 RX ADMIN — Medication 650 MILLIGRAM(S): at 17:32

## 2023-02-02 RX ADMIN — OXYCODONE HYDROCHLORIDE 2.5 MILLIGRAM(S): 5 TABLET ORAL at 02:27

## 2023-02-02 RX ADMIN — OXYCODONE HYDROCHLORIDE 2.5 MILLIGRAM(S): 5 TABLET ORAL at 11:19

## 2023-02-02 RX ADMIN — OXYCODONE HYDROCHLORIDE 5 MILLIGRAM(S): 5 TABLET ORAL at 00:52

## 2023-02-02 RX ADMIN — TRAMADOL HYDROCHLORIDE 25 MILLIGRAM(S): 50 TABLET ORAL at 20:20

## 2023-02-02 RX ADMIN — OXYCODONE HYDROCHLORIDE 2.5 MILLIGRAM(S): 5 TABLET ORAL at 12:19

## 2023-02-02 RX ADMIN — TRAMADOL HYDROCHLORIDE 25 MILLIGRAM(S): 50 TABLET ORAL at 19:41

## 2023-02-02 RX ADMIN — OXYCODONE HYDROCHLORIDE 2.5 MILLIGRAM(S): 5 TABLET ORAL at 03:02

## 2023-02-02 NOTE — PHYSICAL THERAPY INITIAL EVALUATION ADULT - ACTIVE RANGE OF MOTION EXAMINATION, REHAB EVAL
LUE not tested 2/2 surgery and is in a sling/Right UE Active ROM was WFL (within functional limits)/bilateral  lower extremity Active ROM was WFL (within functional limits)

## 2023-02-02 NOTE — PROGRESS NOTE ADULT - SUBJECTIVE AND OBJECTIVE BOX
SUBJECTIVE  No acute events overnight. Pain controlled.    OBJECTIVE  Vital Signs Last 24 Hrs  T(C): 36.3 (02 Feb 2023 06:07), Max: 37.2 (01 Feb 2023 10:18)  T(F): 97.4 (02 Feb 2023 06:07), Max: 98.9 (01 Feb 2023 10:18)  HR: 82 (02 Feb 2023 06:07) (69 - 96)  BP: 120/70 (02 Feb 2023 06:07) (111/84 - 177/69)  BP(mean): 102 (01 Feb 2023 17:15) (70 - 102)  RR: 18 (02 Feb 2023 06:07) (16 - 20)  SpO2: 98% (02 Feb 2023 06:07) (94% - 99%)  Parameters below as of 02 Feb 2023 06:07  Patient On (Oxygen Delivery Method): room air    PHYSICAL EXAM  Gen: Lying in bed, NAD  Resp: No increased WOB  LUE:  Splint/dressing c/d/i, compartments soft  Motor: AIN/PIN/U intact  Sensory: Med/Rad/U SILT  +Rad pulse, WWP    LABS                        7.8    9.52  )-----------( 233      ( 01 Feb 2023 02:55 )             25.7     02-01    138  |  104  |  23  ----------------------------<  94  4.1   |  24  |  1.01    Ca    8.2<L>      01 Feb 2023 02:55  Phos  3.1     02-01  Mg     2.2     02-01    TPro  5.9<L>  /  Alb  3.3  /  TBili  0.6  /  DBili  x   /  AST  19  /  ALT  8<L>  /  AlkPhos  55  02-01    PT/INR - ( 01 Feb 2023 10:43 )   PT: 13.2 sec;   INR: 1.14 ratio           ASSESSMENT & PLAN  87yFemale s/p ORIF L olecranon fx on 2/1/23 and L superior pubic ramus fx managed non-operatively.  -NWB LUE in a splint and Posey block, but OK to be WBAT LUE in splint when using a platform walker  -WBAT LLE  -please transfuse PRBCs for hgb <8 (pt's hgb this AM is 7.8)  -pain control  -ice/cold compress  -incentive spirometry  -DVT ppx  -PT/OT SUBJECTIVE  No acute events overnight. Pain controlled.    OBJECTIVE  Vital Signs Last 24 Hrs  T(C): 36.3 (02 Feb 2023 06:07), Max: 37.2 (01 Feb 2023 10:18)  T(F): 97.4 (02 Feb 2023 06:07), Max: 98.9 (01 Feb 2023 10:18)  HR: 82 (02 Feb 2023 06:07) (69 - 96)  BP: 120/70 (02 Feb 2023 06:07) (111/84 - 177/69)  BP(mean): 102 (01 Feb 2023 17:15) (70 - 102)  RR: 18 (02 Feb 2023 06:07) (16 - 20)  SpO2: 98% (02 Feb 2023 06:07) (94% - 99%)  Parameters below as of 02 Feb 2023 06:07  Patient On (Oxygen Delivery Method): room air    PHYSICAL EXAM  Gen: Lying in bed, NAD  Resp: No increased WOB  LUE:  Splint/dressing c/d/i, compartments soft  Motor: AIN/PIN/U intact  Sensory: Med/Rad/U SILT  +Rad pulse, WWP    LABS                        7.8    9.52  )-----------( 233      ( 01 Feb 2023 02:55 )             25.7     02-01    138  |  104  |  23  ----------------------------<  94  4.1   |  24  |  1.01    Ca    8.2<L>      01 Feb 2023 02:55  Phos  3.1     02-01  Mg     2.2     02-01    TPro  5.9<L>  /  Alb  3.3  /  TBili  0.6  /  DBili  x   /  AST  19  /  ALT  8<L>  /  AlkPhos  55  02-01    PT/INR - ( 01 Feb 2023 10:43 )   PT: 13.2 sec;   INR: 1.14 ratio           ASSESSMENT & PLAN  87yFemale s/p ORIF L olecranon fx on 2/1/23 and L superior pubic ramus fx managed non-operatively.  -NWB LUE in a splint and Posey block, but OK to be WBAT LUE in splint when using a platform walker  -WBAT LLE  -please transfuse PRBCs for hgb <8  -pain control  -ice/cold compress  -incentive spirometry  -DVT ppx  -PT/OT

## 2023-02-02 NOTE — OCCUPATIONAL THERAPY INITIAL EVALUATION ADULT - TRANSFER TRAINING, PT EVAL
GOAL: Patient will perform functional transfer independently with use of platform walker in 4 weeks.

## 2023-02-02 NOTE — PHYSICAL THERAPY INITIAL EVALUATION ADULT - PLANNED THERAPY INTERVENTIONS, PT EVAL
GOAL: patient will be able to negotiated 10 stairs (I) with one HR in 2 weeks/balance training/gait training/strengthening/transfer training

## 2023-02-02 NOTE — PHYSICAL THERAPY INITIAL EVALUATION ADULT - ADDITIONAL COMMENTS
Pt lives alone in a split level house, with 5 steps to the upper level where her bedroom is and 7 steps to the bottom floor. Pt. states she was independent in all ADLS/IADLS prior to admission, +drive. Pt. does not own any AD.

## 2023-02-02 NOTE — PROGRESS NOTE ADULT - SUBJECTIVE AND OBJECTIVE BOX
[facility] Division of Hospital Medicine  Cem Pearson MD  Contact M-F, 8A-5P through [employer]  Other Times:  [phone]    Patient is a 87y old  Female who presents with a chief complaint of L olecranon fracture (02 Feb 2023 06:18)    SUBJECTIVE / OVERNIGHT EVENTS: some pain overnight, not sleeping well  ADDITIONAL REVIEW OF SYSTEMS:    MEDICATIONS  (STANDING):  chlorhexidine 2% Cloths 1 Application(s) Topical daily  enoxaparin Injectable 40 milliGRAM(s) SubCutaneous every 24 hours    MEDICATIONS  (PRN):  acetaminophen     Tablet .. 650 milliGRAM(s) Oral every 6 hours PRN Mild Pain (1 - 3)  ondansetron Injectable 4 milliGRAM(s) IV Push every 6 hours PRN Nausea and/or Vomiting  oxyCODONE    IR 5 milliGRAM(s) Oral every 4 hours PRN Severe Pain (7 - 10)  oxyCODONE    IR 2.5 milliGRAM(s) Oral every 4 hours PRN Moderate Pain (4 - 6)      CAPILLARY BLOOD GLUCOSE        I&O's Summary    01 Feb 2023 07:01  -  02 Feb 2023 07:00  --------------------------------------------------------  IN: 730 mL / OUT: 400 mL / NET: 330 mL        PHYSICAL EXAM:  Vital Signs Last 24 Hrs  T(C): 36.7 (02 Feb 2023 12:58), Max: 36.7 (01 Feb 2023 22:05)  T(F): 98.1 (02 Feb 2023 12:58), Max: 98.1 (02 Feb 2023 02:20)  HR: 100 (02 Feb 2023 12:58) (69 - 100)  BP: 102/56 (02 Feb 2023 12:58) (102/56 - 177/69)  BP(mean): 102 (01 Feb 2023 17:15) (70 - 102)  RR: 18 (02 Feb 2023 12:58) (16 - 20)  SpO2: 92% (02 Feb 2023 12:58) (92% - 99%)    Parameters below as of 02 Feb 2023 12:58  Patient On (Oxygen Delivery Method): room air        Constitutional: WDWN resting comfortably in bed; NAD  Head: NC/AT  Eyes: EOMI, anicteric sclera  ENT: no nasal discharge; uvula midline, no oropharyngeal erythema or exudates; dry mucous membranes  Respiratory: CTA B/L; no W/R/R, no retractions  Cardiac: +S1/S2; RRR;   Gastrointestinal: soft, NT/ND; no rebound or guarding  Extremities:  LUE in sling and dressing minimal hand edema  SKIN: skin warm, dry and intact; no rashes, wounds, or scars  Neurologic: AAOx3  Psychiatric: affect and characteristics of appearance, verbalizations, behaviors are appropriate  LABS:                        10.7   10.86 )-----------( 217      ( 02 Feb 2023 06:50 )             34.4     02-02    138  |  104  |  19  ----------------------------<  151<H>  3.8   |  23  |  0.99    Ca    7.9<L>      02 Feb 2023 06:50  Phos  3.1     02-01  Mg     2.2     02-01    TPro  5.9<L>  /  Alb  3.3  /  TBili  0.6  /  DBili  x   /  AST  19  /  ALT  8<L>  /  AlkPhos  55  02-01    PT/INR - ( 01 Feb 2023 10:43 )   PT: 13.2 sec;   INR: 1.14 ratio                       RADIOLOGY & ADDITIONAL TESTS:  Results Reviewed:   Imaging Personally Reviewed:  Electrocardiogram Personally Reviewed:    COORDINATION OF CARE:  Care Discussed with Consultants/Other Providers [Y/N]:  Prior or Outpatient Records Reviewed [Y/N]:

## 2023-02-02 NOTE — ED PROVIDER NOTE - NSTIMEPROVIDERCAREINITIATE_GEN_ER
Transplant Surgery  Inpatient Daily Progress Note  02/02/2023    Assessment & Plan: Serene is a 65 year old female who has a past medical history of Anemia in chronic kidney disease, Anxiety, Arthritis, Brain aneurysm, Chronic low back pain, Depressive disorder (2013), Disease of thyroid gland, ESRD (end stage renal disease) on dialysis (H) (07/2020), GERD (gastroesophageal reflux disease), Hepatic lesion, Hyperlipidemia, Hypertension, Nephrolithiasis, Neuromuscular disorder (H), Osteoporosis, PKD (polycystic kidney disease) (09/01/1990), PTSD (post-traumatic stress disorder), Tobacco abuse, and Vitamin D deficiency. He is now POD #1 from a bilateral native nephrectomy with Dr. Alana Giang.    - Creatinine slightly elevated at 4.35 from 3.88  - Potassium 2/2/2023: 4.9 mmol/L.    Hemodialysis to be completed today as scheduled.    Hematology:  Anemia of chronic disease: Last Hgb: 2/2/2023: 10.4 g/dL. MARIA DOLORES. Continue to monitor with daily labs.    Neurology:  Chronic and Acute pain management: Epidural, Start Ketamine analgesia drip, APAP Q8H + Q4H PRN, Oxycodone 5-10mg q4h PRN, Hydromorphone 0.2-0.4mg Q2H PRN  -PT ordered for movement without pain techniques    Cardiac:  - Hypertensive: Restart home antihypertensives      Respiratory:  - 1 LPM / Device: Nasal cannula   - Continue to titrate supplemental oxygen to SpO2 goals  - DC capnography today (POD #1)  - Encourage good pulmonary hygiene: IS use Q1H, Deep breathing and coughing    GI/Nutrition:  Diet: NPO  Bowel regimen: Senna BID, PEG QD  - Ondansetron PRN for nausea    Endocrine:   - Goal glucose 100-150mg/dl    Fluid/Electrolytes:   - MIVF: Straight rate; do not stop until NG removed    Infectious disease: Afebrile    Prophylaxis:   - Fall  - GI (Pantoprazole)    Disposition: Pending recovery    Medical Decision Making: Medium  Subsequent visit 22135 (moderate level decision making)    RAS/Fellow/Resident Provider: Birgido Awad MD    Faculty: Alana  "MD Rahat  _________________________________________________________________  Transplant History:   N/A, Postoperative day:      Interval History: \"History is obtained from the patient\"  Overnight events: NAEON. Reports that pain is moderately well controlled; only hurts with certain movements. Reports she has not passed gas.    ROS:   A 10-point review of systems was negative except as noted above.    Meds:    sodium chloride 0.9%  250 mL Intravenous Once in dialysis/CRRT     acetaminophen  975 mg Oral Q8H     sennosides  5 mL Oral BID    And     docusate  50 mg Oral BID     levothyroxine  50 mcg Oral or NG Tube At Bedtime     - MEDICATION INSTRUCTIONS -   Does not apply Once     pantoprazole  40 mg Oral or NG Tube QAM AC     polyethylene glycol  17 g Oral Daily     sertraline  200 mg Oral or NG Tube At Bedtime     sodium chloride (PF)  3 mL Intravenous Q8H       Physical Exam:     Admit Weight: 48.3 kg (106 lb 7.7 oz)    Current vitals:   BP (!) 171/86 (BP Location: Right arm)   Pulse 92   Temp 97.8  F (36.6  C) (Oral)   Resp 16   Ht 1.524 m (5')   Wt 50.3 kg (110 lb 14.3 oz)   SpO2 96%   BMI 21.66 kg/m      Vital sign ranges:    Temp:  [97.5  F (36.4  C)-98.4  F (36.9  C)] 97.8  F (36.6  C)  Pulse:  [78-92] 92  Resp:  [16-27] 16  BP: (127-171)/(67-86) 171/86  SpO2:  [89 %-100 %] 96 %    General Appearance: in no apparent distress.   Skin: Warm, perfused  Heart: Slightly Hypertensive, Normocardic.  Lungs: NWOB on 1 LPM / Device: Nasal cannula  Abdomen: The abdomen is appropriately tender, ND, soft. Midline incision is present c/d/i with glue in place.  Extremities: edema: trace, strength 5/5  Neurologic: alert and oriented. Tremor absent..     Data:   CMP  Recent Labs   Lab 02/02/23  0723 02/02/23  0612 02/01/23  1320 02/01/23  1320 02/01/23  1103   NA  --  136  --  135* 130*   POTASSIUM  --  4.9  --  4.8 4.4   CHLORIDE  --  100  --  98  --    CO2  --  22  --  25  --    * 654*   < > 162* 128* "   BUN  --  30.1*  --  27.9*  --    CR  --  4.35*  --  3.88*  --    GFRESTIMATED  --  11*  --  12*  --    GAVIN  --  8.8  --  8.4*  --    ICAW  --   --   --   --  3.9*   MAG  --  1.7  --  1.9  --    PHOS  --  4.2  --  3.1  --     < > = values in this interval not displayed.     CBC  Recent Labs   Lab 02/02/23  0612 02/01/23  1320   HGB 10.4* 10.1*   WBC 9.7 10.8    272        30-Jan-2023 19:44

## 2023-02-02 NOTE — PHYSICAL THERAPY INITIAL EVALUATION ADULT - PERTINENT HX OF CURRENT PROBLEM, REHAB EVAL
87 year old female with PMH CVA, CAD, HTN, HLD and depression who presents after a fall. The patient was in her usual state of health yesterday when she was walking down the stairs to her basement. She missed the last step and fell onto her L side. Prior to the fall, she denied any symptoms such as chest pain, shortness of breath, dizziness or lightheadedness. She complained of L hip pain and L elbow pain and was brought to the ED for evaluation. On arrival, vital signs were BP 96/63, HR 59, RR 16, temperature 98.1 degrees Farenheit and saturating 97% on room air. Imaging was significant for acute nondisplaced, mildly impacted left parasymphyseal pubic fracture and acute displaced L olecranon fracture. She was evaluated by orthopedics who recommended admission and ORIF of the L olecranon fracture. EKG 1/30/23 + R BBB. XR pelvis 1/31/23 + Acute left pubic body fracture. XR elbow 1/31/23 interval cast reduction of a displaced olecranon fracture. Chest XR 1/30/23 + stable R lung nodules. XR elbow/forearm/shoulder 1/30/23 + Acute displaced fracture of the olecranon. XR pelvis/hip 1/30/23 (-). CT pelvis 1/31/23 + Acute nondisplaced, mildly impacted left parasymphyseal pubic fracture. No displaced sacral fracture

## 2023-02-02 NOTE — PHYSICAL THERAPY INITIAL EVALUATION ADULT - NSPTDISCHREC_GEN_A_CORE
subacute rehab for strengthening, bed mob, transfer, gait and balance, endurance training/Sub-acute Rehab

## 2023-02-02 NOTE — PHYSICAL THERAPY INITIAL EVALUATION ADULT - GENERAL OBSERVATIONS, REHAB EVAL
Pt. received semi-supine in bed, +ace bandage/sling on LUE, +O2 monitor. Pt. received semi-supine in bed, +cast/sling on LUE, +O2 monitor.

## 2023-02-03 LAB
ANION GAP SERPL CALC-SCNC: 9 MMOL/L — SIGNIFICANT CHANGE UP (ref 5–17)
BUN SERPL-MCNC: 17 MG/DL — SIGNIFICANT CHANGE UP (ref 7–23)
CALCIUM SERPL-MCNC: 8.1 MG/DL — LOW (ref 8.4–10.5)
CHLORIDE SERPL-SCNC: 103 MMOL/L — SIGNIFICANT CHANGE UP (ref 96–108)
CO2 SERPL-SCNC: 26 MMOL/L — SIGNIFICANT CHANGE UP (ref 22–31)
CREAT SERPL-MCNC: 0.98 MG/DL — SIGNIFICANT CHANGE UP (ref 0.5–1.3)
EGFR: 56 ML/MIN/1.73M2 — LOW
GLUCOSE SERPL-MCNC: 105 MG/DL — HIGH (ref 70–99)
POTASSIUM SERPL-MCNC: 4 MMOL/L — SIGNIFICANT CHANGE UP (ref 3.5–5.3)
POTASSIUM SERPL-SCNC: 4 MMOL/L — SIGNIFICANT CHANGE UP (ref 3.5–5.3)
SODIUM SERPL-SCNC: 138 MMOL/L — SIGNIFICANT CHANGE UP (ref 135–145)

## 2023-02-03 PROCEDURE — 99232 SBSQ HOSP IP/OBS MODERATE 35: CPT

## 2023-02-03 RX ORDER — SIMVASTATIN 20 MG/1
40 TABLET, FILM COATED ORAL AT BEDTIME
Refills: 0 | Status: DISCONTINUED | OUTPATIENT
Start: 2023-02-03 | End: 2023-02-03

## 2023-02-03 RX ORDER — ATORVASTATIN CALCIUM 80 MG/1
40 TABLET, FILM COATED ORAL AT BEDTIME
Refills: 0 | Status: DISCONTINUED | OUTPATIENT
Start: 2023-02-03 | End: 2023-02-06

## 2023-02-03 RX ORDER — CLOPIDOGREL BISULFATE 75 MG/1
75 TABLET, FILM COATED ORAL DAILY
Refills: 0 | Status: DISCONTINUED | OUTPATIENT
Start: 2023-02-03 | End: 2023-02-06

## 2023-02-03 RX ORDER — ESCITALOPRAM OXALATE 10 MG/1
10 TABLET, FILM COATED ORAL DAILY
Refills: 0 | Status: DISCONTINUED | OUTPATIENT
Start: 2023-02-03 | End: 2023-02-06

## 2023-02-03 RX ORDER — POLYETHYLENE GLYCOL 3350 17 G/17G
17 POWDER, FOR SOLUTION ORAL DAILY
Refills: 0 | Status: DISCONTINUED | OUTPATIENT
Start: 2023-02-03 | End: 2023-02-04

## 2023-02-03 RX ORDER — FUROSEMIDE 40 MG
40 TABLET ORAL DAILY
Refills: 0 | Status: DISCONTINUED | OUTPATIENT
Start: 2023-02-03 | End: 2023-02-06

## 2023-02-03 RX ADMIN — ATORVASTATIN CALCIUM 40 MILLIGRAM(S): 80 TABLET, FILM COATED ORAL at 21:47

## 2023-02-03 RX ADMIN — Medication 650 MILLIGRAM(S): at 06:20

## 2023-02-03 RX ADMIN — ENOXAPARIN SODIUM 40 MILLIGRAM(S): 100 INJECTION SUBCUTANEOUS at 21:47

## 2023-02-03 RX ADMIN — ESCITALOPRAM OXALATE 10 MILLIGRAM(S): 10 TABLET, FILM COATED ORAL at 15:03

## 2023-02-03 RX ADMIN — Medication 650 MILLIGRAM(S): at 12:23

## 2023-02-03 RX ADMIN — Medication 650 MILLIGRAM(S): at 19:18

## 2023-02-03 RX ADMIN — LOSARTAN POTASSIUM 50 MILLIGRAM(S): 100 TABLET, FILM COATED ORAL at 05:18

## 2023-02-03 RX ADMIN — Medication 650 MILLIGRAM(S): at 11:53

## 2023-02-03 RX ADMIN — Medication 650 MILLIGRAM(S): at 05:17

## 2023-02-03 RX ADMIN — CHLORHEXIDINE GLUCONATE 1 APPLICATION(S): 213 SOLUTION TOPICAL at 12:24

## 2023-02-03 NOTE — PROGRESS NOTE ADULT - SUBJECTIVE AND OBJECTIVE BOX
SUBJECTIVE  Seen at bedside sleeping comfortably this morning easily arousable No acute events overnight. Pain controlled.    OBJECTIVE    Vital Signs Last 24 Hrs  T(C): 36.4 (03 Feb 2023 05:17), Max: 36.9 (02 Feb 2023 21:26)  T(F): 97.5 (03 Feb 2023 05:17), Max: 98.4 (02 Feb 2023 21:26)  HR: 72 (03 Feb 2023 05:17) (72 - 100)  BP: 161/75 (03 Feb 2023 05:17) (102/56 - 161/75)  BP(mean): --  RR: 16 (03 Feb 2023 05:17) (16 - 18)  SpO2: 97% (03 Feb 2023 05:17) (92% - 97%)    Parameters below as of 03 Feb 2023 05:17  Patient On (Oxygen Delivery Method): nasal cannula  O2 Flow (L/min): 2    LABS:                        10.7   10.86 )-----------( 217      ( 02 Feb 2023 06:50 )             34.4     02 Feb 2023 06:50    138    |  104    |  19     ----------------------------<  151    3.8     |  23     |  0.99     Ca    7.9        02 Feb 2023 06:50      PT/INR - ( 01 Feb 2023 10:43 )   PT: 13.2 sec;   INR: 1.14 ratio           PHYSICAL EXAM  Gen: Lying in bed, NAD  Resp: No increased WOB  LUE:  Splint/dressing c/d/i, compartments soft  Motor: AIN/PIN/U intact  Sensory: Med/Rad/U SILT  +Rad pulse, WWP       ASSESSMENT & PLAN  87yFemale s/p ORIF L olecranon fx on 2/1/23 and L superior pubic ramus fx managed non-operatively.    -NWB LUE in a splint and Posey block, but OK to be WBAT LUE in splint when using a platform walker  -WBAT LLE  -please transfuse PRBCs for hgb <8  -pain control  -ice/cold compress  -incentive spirometry  -DVT ppx  -PT/OT  -No further orthopedic intervention required at this time  -Follow up as an outpatient with Dr. Fuller in 10-14 days post operatively (2/11 to 2/15 approx)

## 2023-02-03 NOTE — PROGRESS NOTE ADULT - SUBJECTIVE AND OBJECTIVE BOX
[facility] Division of Hospital Medicine  Cem Pearson MD  Contact M-F, 8A-5P through [employer] Teams  Other Times:  [phone]    Patient is a 87y old  Female who presents with a chief complaint of L olecranon fracture (03 Feb 2023 06:47)    SUBJECTIVE / OVERNIGHT EVENTS: no events - slept well  ADDITIONAL REVIEW OF SYSTEMS:    MEDICATIONS  (STANDING):  chlorhexidine 2% Cloths 1 Application(s) Topical daily  clopidogrel Tablet 75 milliGRAM(s) Oral daily  enoxaparin Injectable 40 milliGRAM(s) SubCutaneous every 24 hours  furosemide    Tablet 40 milliGRAM(s) Oral daily  losartan 50 milliGRAM(s) Oral daily    MEDICATIONS  (PRN):  acetaminophen     Tablet .. 650 milliGRAM(s) Oral every 6 hours PRN Mild Pain (1 - 3)  LORazepam     Tablet 0.5 milliGRAM(s) Oral at bedtime PRN Anxiety  melatonin 1 milliGRAM(s) Oral at bedtime PRN Insomnia  ondansetron Injectable 4 milliGRAM(s) IV Push every 6 hours PRN Nausea and/or Vomiting  polyethylene glycol 3350 17 Gram(s) Oral daily PRN Constipation  traMADol 25 milliGRAM(s) Oral two times a day PRN Moderate Pain (4 - 6)      CAPILLARY BLOOD GLUCOSE        I&O's Summary      PHYSICAL EXAM:  Vital Signs Last 24 Hrs  T(C): 36.4 (03 Feb 2023 13:12), Max: 36.9 (02 Feb 2023 21:26)  T(F): 97.5 (03 Feb 2023 13:12), Max: 98.4 (02 Feb 2023 21:26)  HR: 67 (03 Feb 2023 13:12) (67 - 82)  BP: 126/72 (03 Feb 2023 13:12) (117/55 - 161/75)  BP(mean): --  RR: 17 (03 Feb 2023 13:12) (16 - 18)  SpO2: 94% (03 Feb 2023 13:12) (94% - 98%)    Parameters below as of 03 Feb 2023 13:12  Patient On (Oxygen Delivery Method): nasal cannula  O2 Flow (L/min): 2  Constitutional: WDWN resting comfortably in bed; NAD  Eyes: EOMI, anicteric sclera  ENT: no nasal discharge; uvula midline, no oropharyngeal erythema or exudates; dry mucous membranes  Respiratory: CTA B/L; no wheeze or crackle  Cardiac: +S1/S2; RRR;   Gastrointestinal: soft, NT/ND; no rebound or guarding  Extremities:  LUE in sling and dressing minimal hand edema  SKIN: skin warm, dry and intact; no rashes, wounds, or scars  Neurologic: AAOx3  Psychiatric: affect and characteristics of appearance, verbalizations, behaviors are appropriate    LABS:                        10.7   10.86 )-----------( 217      ( 02 Feb 2023 06:50 )             34.4     02-03    138  |  103  |  17  ----------------------------<  105<H>  4.0   |  26  |  0.98    Ca    8.1<L>      03 Feb 2023 06:46      RADIOLOGY & ADDITIONAL TESTS:  Results Reviewed:   Imaging Personally Reviewed:  Electrocardiogram Personally Reviewed:    COORDINATION OF CARE:  Care Discussed with Consultants/Other Providers [Y/N]: ortho Dr Maryann patel antiplatelet and dvt ppx mgmt  Prior or Outpatient Records Reviewed [Y/N]:

## 2023-02-04 LAB
ANION GAP SERPL CALC-SCNC: 8 MMOL/L — SIGNIFICANT CHANGE UP (ref 5–17)
BUN SERPL-MCNC: 14 MG/DL — SIGNIFICANT CHANGE UP (ref 7–23)
CALCIUM SERPL-MCNC: 8.4 MG/DL — SIGNIFICANT CHANGE UP (ref 8.4–10.5)
CHLORIDE SERPL-SCNC: 105 MMOL/L — SIGNIFICANT CHANGE UP (ref 96–108)
CO2 SERPL-SCNC: 26 MMOL/L — SIGNIFICANT CHANGE UP (ref 22–31)
CREAT SERPL-MCNC: 0.79 MG/DL — SIGNIFICANT CHANGE UP (ref 0.5–1.3)
EGFR: 72 ML/MIN/1.73M2 — SIGNIFICANT CHANGE UP
GLUCOSE SERPL-MCNC: 94 MG/DL — SIGNIFICANT CHANGE UP (ref 70–99)
POTASSIUM SERPL-MCNC: 4.5 MMOL/L — SIGNIFICANT CHANGE UP (ref 3.5–5.3)
POTASSIUM SERPL-SCNC: 4.5 MMOL/L — SIGNIFICANT CHANGE UP (ref 3.5–5.3)
SODIUM SERPL-SCNC: 139 MMOL/L — SIGNIFICANT CHANGE UP (ref 135–145)

## 2023-02-04 PROCEDURE — 99232 SBSQ HOSP IP/OBS MODERATE 35: CPT

## 2023-02-04 RX ORDER — SENNA PLUS 8.6 MG/1
2 TABLET ORAL AT BEDTIME
Refills: 0 | Status: DISCONTINUED | OUTPATIENT
Start: 2023-02-04 | End: 2023-02-06

## 2023-02-04 RX ORDER — POLYETHYLENE GLYCOL 3350 17 G/17G
17 POWDER, FOR SOLUTION ORAL DAILY
Refills: 0 | Status: DISCONTINUED | OUTPATIENT
Start: 2023-02-04 | End: 2023-02-06

## 2023-02-04 RX ADMIN — Medication 650 MILLIGRAM(S): at 18:10

## 2023-02-04 RX ADMIN — ESCITALOPRAM OXALATE 10 MILLIGRAM(S): 10 TABLET, FILM COATED ORAL at 12:27

## 2023-02-04 RX ADMIN — ENOXAPARIN SODIUM 40 MILLIGRAM(S): 100 INJECTION SUBCUTANEOUS at 21:28

## 2023-02-04 RX ADMIN — POLYETHYLENE GLYCOL 3350 17 GRAM(S): 17 POWDER, FOR SOLUTION ORAL at 16:33

## 2023-02-04 RX ADMIN — ATORVASTATIN CALCIUM 40 MILLIGRAM(S): 80 TABLET, FILM COATED ORAL at 21:28

## 2023-02-04 RX ADMIN — LOSARTAN POTASSIUM 50 MILLIGRAM(S): 100 TABLET, FILM COATED ORAL at 05:38

## 2023-02-04 RX ADMIN — Medication 0.5 MILLIGRAM(S): at 21:28

## 2023-02-04 RX ADMIN — CHLORHEXIDINE GLUCONATE 1 APPLICATION(S): 213 SOLUTION TOPICAL at 14:04

## 2023-02-04 RX ADMIN — CLOPIDOGREL BISULFATE 75 MILLIGRAM(S): 75 TABLET, FILM COATED ORAL at 12:27

## 2023-02-04 RX ADMIN — Medication 650 MILLIGRAM(S): at 18:40

## 2023-02-04 RX ADMIN — SENNA PLUS 2 TABLET(S): 8.6 TABLET ORAL at 21:31

## 2023-02-04 RX ADMIN — Medication 40 MILLIGRAM(S): at 05:39

## 2023-02-04 NOTE — PROGRESS NOTE ADULT - SUBJECTIVE AND OBJECTIVE BOX
[facility] Division of Hospital Medicine  Keily Gates DO  Available on Teams Mela    Patient is a 87y old  Female who presents with a chief complaint of L olecranon fracture (03 Feb 2023 14:32)      SUBJECTIVE / OVERNIGHT EVENTS: none. Feels that she has been urinating very frequently, but denies dysuria. Says she's trying to restrict her water intake to urinate less but it's not working. Left arm pain in acceptable. Has not had a BM since admission.   ADDITIONAL REVIEW OF SYSTEMS: negative    MEDICATIONS  (STANDING):  atorvastatin 40 milliGRAM(s) Oral at bedtime  chlorhexidine 2% Cloths 1 Application(s) Topical daily  clopidogrel Tablet 75 milliGRAM(s) Oral daily  enoxaparin Injectable 40 milliGRAM(s) SubCutaneous every 24 hours  escitalopram 10 milliGRAM(s) Oral daily  furosemide    Tablet 40 milliGRAM(s) Oral daily  losartan 50 milliGRAM(s) Oral daily  polyethylene glycol 3350 17 Gram(s) Oral daily  senna 2 Tablet(s) Oral at bedtime    MEDICATIONS  (PRN):  acetaminophen     Tablet .. 650 milliGRAM(s) Oral every 6 hours PRN Mild Pain (1 - 3)  LORazepam     Tablet 0.5 milliGRAM(s) Oral at bedtime PRN Anxiety  melatonin 1 milliGRAM(s) Oral at bedtime PRN Insomnia  ondansetron Injectable 4 milliGRAM(s) IV Push every 6 hours PRN Nausea and/or Vomiting  traMADol 25 milliGRAM(s) Oral two times a day PRN Moderate Pain (4 - 6)      CAPILLARY BLOOD GLUCOSE        I&O's Summary    03 Feb 2023 07:01  -  04 Feb 2023 07:00  --------------------------------------------------------  IN: 200 mL / OUT: 400 mL / NET: -200 mL        PHYSICAL EXAM:  Vital Signs Last 24 Hrs  T(C): 36.6 (04 Feb 2023 08:26), Max: 36.6 (04 Feb 2023 05:30)  T(F): 97.9 (04 Feb 2023 08:26), Max: 97.9 (04 Feb 2023 08:26)  HR: 70 (04 Feb 2023 08:26) (66 - 70)  BP: 121/62 (04 Feb 2023 08:26) (121/62 - 154/72)  BP(mean): --  RR: 18 (04 Feb 2023 08:26) (17 - 18)  SpO2: 98% (04 Feb 2023 08:26) (94% - 100%)    Parameters below as of 04 Feb 2023 08:26  Patient On (Oxygen Delivery Method): nasal cannula  O2 Flow (L/min): 2      CONSTITUTIONAL: Well-groomed, in no apparent distress  EYES: No conjunctival or scleral injection, non-icteric; PERRLA and symmetric  ENMT: No external nasal lesions; no pharyngeal injection or exudates, oral mucosa with moist membranes  NECK: Trachea midline without palpable neck mass; thyroid not enlarged and non-tender  RESPIRATORY: Breathing comfortably; lungs CTA without wheeze/rhonchi/rales  CARDIOVASCULAR: +S1S2, RRR, no M/G/R; pedal pulses full and symmetric; no lower extremity edema  GASTROINTESTINAL: No palpable masses or tenderness, +BS throughout, no rebound/guarding; no hepatosplenomegaly; no hernia palpated  LYMPHATIC: No cervical LAD or tenderness; no axillary LAD or tenderness  MUSCULOSKELETAL: no digital clubbing or cyanosis; no paraspinal tenderness; L arm in sling  NEUROLOGIC: CN II-XII intact; sensation intact in LEs b/l to light touch  PSYCHIATRIC: A+O x 3; mood and affect appropriate; appropriate insight and judgment    LABS:    02-04    139  |  105  |  14  ----------------------------<  94  4.5   |  26  |  0.79    Ca    8.4      04 Feb 2023 07:09

## 2023-02-05 DIAGNOSIS — R55 SYNCOPE AND COLLAPSE: ICD-10-CM

## 2023-02-05 LAB
APPEARANCE UR: CLEAR — SIGNIFICANT CHANGE UP
BACTERIA # UR AUTO: NEGATIVE — SIGNIFICANT CHANGE UP
BILIRUB UR-MCNC: NEGATIVE — SIGNIFICANT CHANGE UP
COLOR SPEC: YELLOW — SIGNIFICANT CHANGE UP
DIFF PNL FLD: ABNORMAL
EPI CELLS # UR: 1 /HPF — SIGNIFICANT CHANGE UP
GLUCOSE UR QL: NEGATIVE — SIGNIFICANT CHANGE UP
HYALINE CASTS # UR AUTO: 1 /LPF — SIGNIFICANT CHANGE UP (ref 0–2)
KETONES UR-MCNC: NEGATIVE — SIGNIFICANT CHANGE UP
LEUKOCYTE ESTERASE UR-ACNC: ABNORMAL
NITRITE UR-MCNC: NEGATIVE — SIGNIFICANT CHANGE UP
PH UR: 7 — SIGNIFICANT CHANGE UP (ref 5–8)
PROT UR-MCNC: ABNORMAL
RBC CASTS # UR COMP ASSIST: 9 /HPF — HIGH (ref 0–4)
SARS-COV-2 RNA SPEC QL NAA+PROBE: SIGNIFICANT CHANGE UP
SP GR SPEC: 1.02 — SIGNIFICANT CHANGE UP (ref 1.01–1.02)
UROBILINOGEN FLD QL: ABNORMAL
WBC UR QL: 13 /HPF — HIGH (ref 0–5)

## 2023-02-05 PROCEDURE — 93010 ELECTROCARDIOGRAM REPORT: CPT

## 2023-02-05 PROCEDURE — 99232 SBSQ HOSP IP/OBS MODERATE 35: CPT

## 2023-02-05 RX ORDER — METOPROLOL TARTRATE 50 MG
50 TABLET ORAL DAILY
Refills: 0 | Status: DISCONTINUED | OUTPATIENT
Start: 2023-02-05 | End: 2023-02-06

## 2023-02-05 RX ORDER — SODIUM CHLORIDE 9 MG/ML
1000 INJECTION INTRAMUSCULAR; INTRAVENOUS; SUBCUTANEOUS
Refills: 0 | Status: DISCONTINUED | OUTPATIENT
Start: 2023-02-05 | End: 2023-02-06

## 2023-02-05 RX ADMIN — CLOPIDOGREL BISULFATE 75 MILLIGRAM(S): 75 TABLET, FILM COATED ORAL at 11:39

## 2023-02-05 RX ADMIN — Medication 0.5 MILLIGRAM(S): at 21:32

## 2023-02-05 RX ADMIN — ENOXAPARIN SODIUM 40 MILLIGRAM(S): 100 INJECTION SUBCUTANEOUS at 21:32

## 2023-02-05 RX ADMIN — LOSARTAN POTASSIUM 50 MILLIGRAM(S): 100 TABLET, FILM COATED ORAL at 05:21

## 2023-02-05 RX ADMIN — ESCITALOPRAM OXALATE 10 MILLIGRAM(S): 10 TABLET, FILM COATED ORAL at 11:39

## 2023-02-05 RX ADMIN — SENNA PLUS 2 TABLET(S): 8.6 TABLET ORAL at 21:31

## 2023-02-05 RX ADMIN — Medication 650 MILLIGRAM(S): at 21:31

## 2023-02-05 RX ADMIN — ATORVASTATIN CALCIUM 40 MILLIGRAM(S): 80 TABLET, FILM COATED ORAL at 21:31

## 2023-02-05 RX ADMIN — SODIUM CHLORIDE 75 MILLILITER(S): 9 INJECTION INTRAMUSCULAR; INTRAVENOUS; SUBCUTANEOUS at 11:15

## 2023-02-05 RX ADMIN — Medication 40 MILLIGRAM(S): at 05:21

## 2023-02-05 RX ADMIN — Medication 1 MILLIGRAM(S): at 21:31

## 2023-02-05 RX ADMIN — CHLORHEXIDINE GLUCONATE 1 APPLICATION(S): 213 SOLUTION TOPICAL at 11:43

## 2023-02-05 RX ADMIN — Medication 650 MILLIGRAM(S): at 22:32

## 2023-02-05 NOTE — PROGRESS NOTE ADULT - PROBLEM SELECTOR PLAN 9
adat      Urinary frequency  - check UA, bladder scan to r/o UTI and retention, respectively  - increase bowel regimen, constipation may be contributing to urinary symptoms
lovenox
-LR at 75 while NPO  -Replete to K>4 and Mg>2  -NPO for OR
adat
adat

## 2023-02-05 NOTE — PROGRESS NOTE ADULT - PROBLEM SELECTOR PROBLEM 4
CAD (coronary artery disease)
CVA (cerebrovascular accident)

## 2023-02-05 NOTE — PROGRESS NOTE ADULT - PROBLEM SELECTOR PLAN 4
-Patient with a history of CAD, continue Metoprolol Succinate 50mg daily and takes Rosuvastatin 40mg daily, continue with Atorvastatin 80mg daily via therapeutic interchange
-Patient with a history of CVA, holding home Plavix 75mg in the setting of OR, restart per ortho recs  restart plavix on dc
-Patient with a history of CVA, holding home Plavix 75mg in the setting of OR, restart per ortho recs
-restart plavix  statin as above
-restart plavix  statin as above

## 2023-02-05 NOTE — PROGRESS NOTE ADULT - PROBLEM SELECTOR PROBLEM 3
Pubic ramus fracture
CAD (coronary artery disease)

## 2023-02-05 NOTE — PROGRESS NOTE ADULT - PROBLEM SELECTOR PROBLEM 8
Need for prophylactic measure
Other depression
Need for prophylactic measure

## 2023-02-05 NOTE — PROGRESS NOTE ADULT - PROBLEM SELECTOR PLAN 10
adat      Urinary frequency  - bladder scan w/0 ml  - UA w/13 wbcs, large leuk esterase, but no bacteria and no nitrite  - suspect constipation is contributing to urinary symptoms, had first BM in 5-6 days on 2/5, monitor for improvement

## 2023-02-05 NOTE — PROGRESS NOTE ADULT - REASON FOR ADMISSION
L olecranon fracture

## 2023-02-05 NOTE — PROGRESS NOTE ADULT - PROBLEM SELECTOR PLAN 2
as above f/u PT  WBAT L LE
sp ORIF  DVT ppx  pain control - po tramadol  f/u PT OT  NWB LUE but OK to be WBAT LUE in splint when using a platform walker, per ortho    insomnia - add melatonin  bowel regimen - miralax/senna, standing
as above f/u PT  WBAT L LE
as above f/u PT  WBAT L LE
-Patient s/p fall found to have L pubic ramus fracture  -Will need PT after OR for ORIF of L olecranon

## 2023-02-05 NOTE — PROGRESS NOTE ADULT - PROBLEM SELECTOR PROBLEM 5
HTN (hypertension)
HTN (hypertension)
CVA (cerebrovascular accident)
HTN (hypertension)
HTN (hypertension)

## 2023-02-05 NOTE — PROGRESS NOTE ADULT - NSPROGADDITIONALINFOA_GEN_ALL_CORE
await insurance auth to [facility]    Plan discussed with NP Nohelia Gates, DO  Available on Teams catia
await insurance auth to [facility]    Plan discussed with NP Nohelia Gates, DO  Available on Teams catia
await insurance auth to [facility]

## 2023-02-05 NOTE — PROGRESS NOTE ADULT - PROBLEM SELECTOR PLAN 5
-Patient with a history of HTN, takes Furosemide 80mg daily and Losartan 50mg daily, holding for now as patient going to OR, don't want to drop BP, restart as tolerated
-restart plavix  statin as above
restart bp meds as tolerated,   restart lasix today at half dose  monitor bp, bmp
restart bp meds as tolerated,   restart lasix today at half dose  monitor bp, bmp
restart bp meds as tolerated, starting with arb within 24 hr of procedure

## 2023-02-05 NOTE — PROGRESS NOTE ADULT - PROBLEM SELECTOR PLAN 7
-Patient with a history of depression, continue Escitalopram 10mg daily
-Patient with a history of depression, continue Escitalopram 10mg daily
-Patient with history of HLD and takes Rosuvastatin 40mg daily, continue with Atorvastatin 80mg daily via therapeutic interchange
-Patient with a history of depression, continue Escitalopram 10mg daily
-Patient with a history of depression, continue Escitalopram 10mg daily

## 2023-02-05 NOTE — PROGRESS NOTE ADULT - PROVIDER SPECIALTY LIST ADULT
Hospitalist
Orthopedics
Hospitalist
Internal Medicine
Hospitalist
Internal Medicine

## 2023-02-05 NOTE — PROGRESS NOTE ADULT - PROBLEM SELECTOR PROBLEM 2
Pubic ramus fracture
Pubic ramus fracture
Olecranon fracture
Pubic ramus fracture
Pubic ramus fracture

## 2023-02-05 NOTE — PROGRESS NOTE ADULT - PROBLEM SELECTOR PLAN 3
-Patient with a history of CAD, continue Metoprolol Succinate 50mg daily and takes Rosuvastatin 40mg daily, continue with Atorvastatin 80mg daily via therapeutic interchange
-Patient with a history of CAD, continue Metoprolol Succinate 50mg daily and takes Rosuvastatin 40mg daily, continue with Atorvastatin 80mg daily via therapeutic interchange
as above f/u PT  WBAT L LE
-Patient with a history of CAD, continue Metoprolol Succinate 50mg daily and takes Rosuvastatin 40mg daily, continue with Atorvastatin 80mg daily via therapeutic interchange
-Patient with a history of CAD, continue Metoprolol Succinate 50mg daily and takes Rosuvastatin 40mg daily, continue with Atorvastatin 80mg daily via therapeutic interchange

## 2023-02-05 NOTE — PROGRESS NOTE ADULT - SUBJECTIVE AND OBJECTIVE BOX
[facility] Division of Hospital Medicine  Keily Gates DO  Available on Teams Mela    Patient is a 87y old  Female who presents with a chief complaint of L olecranon fracture (2023 12:32)      SUBJECTIVE / OVERNIGHT EVENTS: none. Patient felt like she might have a BM today. Still having some lower abdominal pain.     Later, after patient had been transported to the toilet, she had a BM, and then went unconscious ("eyes rolled back"), she woke up quickly afterwards. Did not fall.    ADDITIONAL REVIEW OF SYSTEMS: negative    MEDICATIONS  (STANDING):  atorvastatin 40 milliGRAM(s) Oral at bedtime  chlorhexidine 2% Cloths 1 Application(s) Topical daily  clopidogrel Tablet 75 milliGRAM(s) Oral daily  enoxaparin Injectable 40 milliGRAM(s) SubCutaneous every 24 hours  escitalopram 10 milliGRAM(s) Oral daily  furosemide    Tablet 40 milliGRAM(s) Oral daily  losartan 50 milliGRAM(s) Oral daily  polyethylene glycol 3350 17 Gram(s) Oral daily  senna 2 Tablet(s) Oral at bedtime  sodium chloride 0.9%. 1000 milliLiter(s) (75 mL/Hr) IV Continuous <Continuous>    MEDICATIONS  (PRN):  acetaminophen     Tablet .. 650 milliGRAM(s) Oral every 6 hours PRN Mild Pain (1 - 3)  LORazepam     Tablet 0.5 milliGRAM(s) Oral at bedtime PRN Anxiety  melatonin 1 milliGRAM(s) Oral at bedtime PRN Insomnia  ondansetron Injectable 4 milliGRAM(s) IV Push every 6 hours PRN Nausea and/or Vomiting  traMADol 25 milliGRAM(s) Oral two times a day PRN Moderate Pain (4 - 6)      CAPILLARY BLOOD GLUCOSE        I&O's Summary    2023 07:01  -  2023 07:00  --------------------------------------------------------  IN: 0 mL / OUT: 1150 mL / NET: -1150 mL    2023 07:01  -  2023 12:47  --------------------------------------------------------  IN: 280 mL / OUT: 800 mL / NET: -520 mL        PHYSICAL EXAM:  Vital Signs Last 24 Hrs  T(C): 36.4 (2023 05:24), Max: 37 (2023 21:04)  T(F): 97.6 (2023 05:24), Max: 98.6 (2023 21:04)  HR: 67 (2023 10:15) (65 - 74)  BP: 149/68 (2023 10:15) (120/56 - 157/70)  BP(mean): --  RR: 20 (2023 10:15) (18 - 20)  SpO2: 95% (2023 10:15) (93% - 98%)    Parameters below as of 2023 10:15  Patient On (Oxygen Delivery Method): room air    CONSTITUTIONAL: Well-groomed, in no apparent distress  EYES: No conjunctival or scleral injection, non-icteric; PERRLA and symmetric  ENMT: No external nasal lesions; no pharyngeal injection or exudates, oral mucosa with moist membranes  NECK: Trachea midline without palpable neck mass; thyroid not enlarged and non-tender  RESPIRATORY: Breathing comfortably; lungs CTA without wheeze/rhonchi/rales  CARDIOVASCULAR: +S1S2, RRR, no M/G/R; pedal pulses full and symmetric; no lower extremity edema  GASTROINTESTINAL: No palpable masses or tenderness, +BS throughout, no rebound/guarding; no hepatosplenomegaly; no hernia palpated  LYMPHATIC: No cervical LAD or tenderness; no axillary LAD or tenderness  MUSCULOSKELETAL: no digital clubbing or cyanosis; no paraspinal tenderness; L arm in sling  NEUROLOGIC: CN II-XII intact; sensation intact in LEs b/l to light touch  PSYCHIATRIC: A+O x 3; mood and affect appropriate; appropriate insight and judgment    LABS:        139  |  105  |  14  ----------------------------<  94  4.5   |  26  |  0.79    Ca    8.4      2023 07:09            Urinalysis Basic - ( 2023 00:12 )    Color: Yellow / Appearance: Clear / S.019 / pH: x  Gluc: x / Ketone: Negative  / Bili: Negative / Urobili: 4 mg/dL   Blood: x / Protein: 30 mg/dL / Nitrite: Negative   Leuk Esterase: Large / RBC: 9 /hpf / WBC 13 /HPF   Sq Epi: x / Non Sq Epi: 1 /hpf / Bacteria: Negative

## 2023-02-05 NOTE — CHART NOTE - NSCHARTNOTEFT_GEN_A_CORE
Called by RN for PT with "seizure like activity while in BR having BM and vomited thereafter"  Pt found in br ao times 4 on sarasteady with BM in progress PT able to give details to being in BR not clear to events as indicated ny nursing staff   PT place back in bed and evaluated   neurologically intact   denies sob, chest pain abd pain,   plans   IVF   check orthostatic   place on tele   Dr Gates made aware  Will continue to monitor      Department of Medicine   CHASE Simms AGNP-c [id number]

## 2023-02-05 NOTE — PROGRESS NOTE ADULT - PROBLEM SELECTOR PLAN 6
-Patient with history of HLD and takes Rosuvastatin 40mg daily, continue with Atorvastatin 80mg daily via therapeutic interchange
restart bp meds as tolerated,   restart lasix today at half dose  monitor bp, bmp
-Patient with history of HLD and takes Rosuvastatin 40mg daily, continue with Atorvastatin 80mg daily via therapeutic interchange

## 2023-02-05 NOTE — PROGRESS NOTE ADULT - PROBLEM SELECTOR PLAN 8
-Holding pharmacoprophylaxis in the setting of OR, SCDS
-Patient with a history of depression, continue Escitalopram 10mg daily
lovenox

## 2023-02-06 ENCOUNTER — TRANSCRIPTION ENCOUNTER (OUTPATIENT)
Age: 88
End: 2023-02-06

## 2023-02-06 VITALS — SYSTOLIC BLOOD PRESSURE: 119 MMHG | DIASTOLIC BLOOD PRESSURE: 62 MMHG

## 2023-02-06 PROCEDURE — 36430 TRANSFUSION BLD/BLD COMPNT: CPT

## 2023-02-06 PROCEDURE — 85027 COMPLETE CBC AUTOMATED: CPT

## 2023-02-06 PROCEDURE — 97110 THERAPEUTIC EXERCISES: CPT

## 2023-02-06 PROCEDURE — 86923 COMPATIBILITY TEST ELECTRIC: CPT

## 2023-02-06 PROCEDURE — 85025 COMPLETE CBC W/AUTO DIFF WBC: CPT

## 2023-02-06 PROCEDURE — 99239 HOSP IP/OBS DSCHRG MGMT >30: CPT | Mod: GC

## 2023-02-06 PROCEDURE — 97530 THERAPEUTIC ACTIVITIES: CPT

## 2023-02-06 PROCEDURE — U0005: CPT

## 2023-02-06 PROCEDURE — U0003: CPT

## 2023-02-06 PROCEDURE — 97116 GAIT TRAINING THERAPY: CPT

## 2023-02-06 PROCEDURE — 84100 ASSAY OF PHOSPHORUS: CPT

## 2023-02-06 PROCEDURE — 86850 RBC ANTIBODY SCREEN: CPT

## 2023-02-06 PROCEDURE — 97162 PT EVAL MOD COMPLEX 30 MIN: CPT

## 2023-02-06 PROCEDURE — 80048 BASIC METABOLIC PNL TOTAL CA: CPT

## 2023-02-06 PROCEDURE — 83735 ASSAY OF MAGNESIUM: CPT

## 2023-02-06 PROCEDURE — 81001 URINALYSIS AUTO W/SCOPE: CPT

## 2023-02-06 PROCEDURE — 72192 CT PELVIS W/O DYE: CPT | Mod: MA

## 2023-02-06 PROCEDURE — 93005 ELECTROCARDIOGRAM TRACING: CPT

## 2023-02-06 PROCEDURE — P9016: CPT

## 2023-02-06 PROCEDURE — 72170 X-RAY EXAM OF PELVIS: CPT

## 2023-02-06 PROCEDURE — C1769: CPT

## 2023-02-06 PROCEDURE — 73502 X-RAY EXAM HIP UNI 2-3 VIEWS: CPT

## 2023-02-06 PROCEDURE — 86901 BLOOD TYPING SEROLOGIC RH(D): CPT

## 2023-02-06 PROCEDURE — 85730 THROMBOPLASTIN TIME PARTIAL: CPT

## 2023-02-06 PROCEDURE — 73110 X-RAY EXAM OF WRIST: CPT

## 2023-02-06 PROCEDURE — 97166 OT EVAL MOD COMPLEX 45 MIN: CPT

## 2023-02-06 PROCEDURE — 72190 X-RAY EXAM OF PELVIS: CPT

## 2023-02-06 PROCEDURE — 73060 X-RAY EXAM OF HUMERUS: CPT

## 2023-02-06 PROCEDURE — 73030 X-RAY EXAM OF SHOULDER: CPT

## 2023-02-06 PROCEDURE — 96374 THER/PROPH/DIAG INJ IV PUSH: CPT

## 2023-02-06 PROCEDURE — 71045 X-RAY EXAM CHEST 1 VIEW: CPT

## 2023-02-06 PROCEDURE — 36415 COLL VENOUS BLD VENIPUNCTURE: CPT

## 2023-02-06 PROCEDURE — C1713: CPT

## 2023-02-06 PROCEDURE — 73070 X-RAY EXAM OF ELBOW: CPT

## 2023-02-06 PROCEDURE — 85610 PROTHROMBIN TIME: CPT

## 2023-02-06 PROCEDURE — 87637 SARSCOV2&INF A&B&RSV AMP PRB: CPT

## 2023-02-06 PROCEDURE — 76377 3D RENDER W/INTRP POSTPROCES: CPT

## 2023-02-06 PROCEDURE — 73090 X-RAY EXAM OF FOREARM: CPT

## 2023-02-06 PROCEDURE — 80053 COMPREHEN METABOLIC PANEL: CPT

## 2023-02-06 PROCEDURE — 99285 EMERGENCY DEPT VISIT HI MDM: CPT

## 2023-02-06 PROCEDURE — 76000 FLUOROSCOPY <1 HR PHYS/QHP: CPT

## 2023-02-06 PROCEDURE — 86900 BLOOD TYPING SEROLOGIC ABO: CPT

## 2023-02-06 RX ORDER — METHYLPREDNISOLONE 4 MG
1 TABLET ORAL
Qty: 0 | Refills: 0 | DISCHARGE

## 2023-02-06 RX ORDER — ENOXAPARIN SODIUM 100 MG/ML
40 INJECTION SUBCUTANEOUS
Qty: 0 | Refills: 0 | DISCHARGE
Start: 2023-02-06

## 2023-02-06 RX ORDER — FUROSEMIDE 40 MG
1 TABLET ORAL
Qty: 0 | Refills: 0 | DISCHARGE

## 2023-02-06 RX ORDER — SENNA PLUS 8.6 MG/1
2 TABLET ORAL
Qty: 0 | Refills: 0 | DISCHARGE
Start: 2023-02-06

## 2023-02-06 RX ORDER — LANOLIN ALCOHOL/MO/W.PET/CERES
1 CREAM (GRAM) TOPICAL
Qty: 0 | Refills: 0 | DISCHARGE
Start: 2023-02-06

## 2023-02-06 RX ORDER — POLYETHYLENE GLYCOL 3350 17 G/17G
17 POWDER, FOR SOLUTION ORAL
Qty: 0 | Refills: 0 | DISCHARGE
Start: 2023-02-06

## 2023-02-06 RX ORDER — ACETAMINOPHEN 500 MG
2 TABLET ORAL
Qty: 0 | Refills: 0 | DISCHARGE
Start: 2023-02-06

## 2023-02-06 RX ORDER — TRAMADOL HYDROCHLORIDE 50 MG/1
0.5 TABLET ORAL
Qty: 0 | Refills: 0 | DISCHARGE
Start: 2023-02-06

## 2023-02-06 RX ADMIN — Medication 40 MILLIGRAM(S): at 05:25

## 2023-02-06 RX ADMIN — CHLORHEXIDINE GLUCONATE 1 APPLICATION(S): 213 SOLUTION TOPICAL at 11:16

## 2023-02-06 RX ADMIN — LOSARTAN POTASSIUM 50 MILLIGRAM(S): 100 TABLET, FILM COATED ORAL at 05:25

## 2023-02-06 RX ADMIN — ESCITALOPRAM OXALATE 10 MILLIGRAM(S): 10 TABLET, FILM COATED ORAL at 11:13

## 2023-02-06 RX ADMIN — CLOPIDOGREL BISULFATE 75 MILLIGRAM(S): 75 TABLET, FILM COATED ORAL at 11:13

## 2023-02-06 RX ADMIN — SODIUM CHLORIDE 75 MILLILITER(S): 9 INJECTION INTRAMUSCULAR; INTRAVENOUS; SUBCUTANEOUS at 11:13

## 2023-02-06 NOTE — DISCHARGE NOTE PROVIDER - PROVIDER TOKENS
PROVIDER:[TOKEN:[[id number]],FOLLOWUP:[1 week]],PROVIDER:[TOKEN:[[id number]],FOLLOWUP:[1 week]],PROVIDER:[TOKEN:[[id number]],SCHEDULEDAPPT:[02/11/2023]]

## 2023-02-06 NOTE — DISCHARGE NOTE PROVIDER - NSDCMRMEDTOKEN_GEN_ALL_CORE_FT
clopidogrel 75 mg oral tablet: 1 tab(s) orally once a day  escitalopram 10 mg oral tablet: 1 tab(s) orally once a day  Lasix 80 mg oral tablet: 1 tab(s) orally once a day  losartan 50 mg oral tablet: 1 tab(s) orally once a day  magnesium gluconate 500 mg oral tablet: 1 tab(s) orally once a day  metoprolol succinate 50 mg oral tablet, extended release: 1 tab(s) orally once a day  rosuvastatin 40 mg oral tablet: 1 tab(s) orally once a day   acetaminophen 325 mg oral tablet: 2 tab(s) orally every 6 hours, As needed, Mild Pain (1 - 3)  clopidogrel 75 mg oral tablet: 1 tab(s) orally once a day  enoxaparin: 40 milligram(s) subcutaneously once a day  escitalopram 10 mg oral tablet: 1 tab(s) orally once a day  LORazepam 0.5 mg oral tablet: 1 tab(s) orally once a day (at bedtime), As needed, Anxiety  losartan 50 mg oral tablet: 1 tab(s) orally once a day  melatonin 1 mg oral tablet: 1 tab(s) orally once a day (at bedtime), As needed, Insomnia  metoprolol succinate 50 mg oral tablet, extended release: 1 tab(s) orally once a day  polyethylene glycol 3350 oral powder for reconstitution: 17 gram(s) orally once a day  rosuvastatin 40 mg oral tablet: 1 tab(s) orally once a day  senna leaf extract oral tablet: 2 tab(s) orally once a day (at bedtime)  traMADol 50 mg oral tablet: 0.5 tab(s) orally 2 times a day, As needed, Moderate Pain (4 - 6)

## 2023-02-06 NOTE — DISCHARGE NOTE PROVIDER - CARE PROVIDERS DIRECT ADDRESSES
[email or web address]

## 2023-02-06 NOTE — DISCHARGE NOTE PROVIDER - ATTENDING DISCHARGE PHYSICAL EXAMINATION:
86 y/o F presenting after a mechanical fall with pubic rami and olecranon fracture s/p ORIF. Course c/b vasovagal syncope. Stable for d/c to [facility].  Well appearing, NAD  rrr normal s1 s2  CTAB/L  + bs soft ntnd  wwp + peripheral pulses no edema

## 2023-02-06 NOTE — DISCHARGE NOTE PROVIDER - CARE PROVIDER_API CALL
Onel Clayton)  Internal Medicine  [address] [place] [address]  Phone: [phone]  Fax: [phone]  Follow Up Time: 1 week    DRAKE HOLDEN  Cardiovascular Diseases  [address]  [place] [address]  Phone: [phone]  Fax: [phone]  Follow Up Time: 1 week    Alberto Fuller)  Orthopaedic Surgery  [address]  [place] [address]  Phone: [phone]  Fax: [phone]  Scheduled Appointment: 02/11/2023

## 2023-02-06 NOTE — DISCHARGE NOTE PROVIDER - HOSPITAL COURSE
87-year-old female with past with history of CAD, CVA, HTN on Plavix who presents with left elbow and left hip pain status post mechanical fall. Imaging remarkable for acute nondisplaced, mildly impacted left parasymphyseal pubic fracture and acute displaced L olecranon fracture. Patient was admitted for further medical management. Ortho was consulted s/p ORIF of Left olecranon on 2/1/23. Patient received 1 unit PRBC post procedure given Hgb 7.8 - noted with appropriated response in hgb. Per ortho recs - non-weight bearing to left upper extremity in a splint and Posey block, but okay to be Weight bearing as tolerated to  left upper extremity in splint when using a platform walker; Weight bearing as tolerated to Left lower extremity    PT/OT evaluated patient recommending [facility]. Follow up as an outpatient with Dr. Fuller in 10-14 days post operatively (2/11 to 2/15 approx)    Discharge/Dispo/Med rec discussed with attending  ____. Patient medically cleared for discharge to [facility] with outpatient follow up with PCP/ Ortho. 87-year-old female with past with history of CAD, CVA, HTN on Plavix who presents with left elbow and left hip pain status post mechanical fall. Imaging remarkable for acute nondisplaced, mildly impacted left parasymphyseal pubic fracture and acute displaced L olecranon fracture. Patient was admitted for further medical management. Ortho was consulted s/p ORIF of Left olecranon on 2/1/23. Patient received 1 unit PRBC post procedure given Hgb 7.8 - noted with appropriated response in hgb. Per ortho recs - non-weight bearing to left upper extremity in a splint and Posey block, but okay to be Weight bearing as tolerated to  left upper extremity in splint when using a platform walker; Weight bearing as tolerated to Left lower extremity    PT/OT evaluated patient recommending [facility]. Follow up as an outpatient with Dr. Fuller in 10-14 days post operatively (2/11 to 2/15 approx)    Course c/b syncope event 2/5 witnessed by PCA and nurse post BM. Appears most c/w vasovagal event. Received IVF, Lasix d/leoncio given poor po intake. BP wnl. Currently   asymptomatic.  Stable for d/c to [facility].    #Syncope  #Acute displaced L Olecranon fracture s/p ORIF  #Pubic Rami Fracture  #Urinary Frequency         87 year old female with PMH CVA, CAD, HTN, HLD and depression who presents after a fall found to have acute nondisplaced, mildly impacted left parasymphyseal pubic fracture and acute displaced L olecranon fracture.    > Syncope.   ·  Plan: -suspect vasovagal etiology  -no fall, was witnessed by nurse and PCA while she was on the toilet having a bowel movement  - s/p IVF  -monitor, very low suspicion for first time seizure.    ·  Problem: Olecranon fracture.   ·  Plan: sp ORIF  DVT ppx  pain control - po tramadol  f/u PT OT  NWB LUE but OK to be WBAT LUE in splint when using a platform walker, per ortho    insomnia - add melatonin  bowel regimen - miralax/senna, standing.    > Pubic ramus fracture.   ·  Plan: as above f/u PT  WBAT L LE.      > CAD (coronary artery disease).   ·  Plan: -Patient with a history of CAD, continue Metoprolol Succinate 50mg daily and takes Rosuvastatin 40mg daily, continue with Atorvastatin 80mg daily via therapeutic interchange.    > CVA (cerebrovascular accident).   ·  Plan: -restart plavix  statin as above.    ·  Problem: HTN (hypertension).   ·  Plan: restart bp meds as tolerated,   restart lasix today at half dose  monitor bp, bmp.      ·  Problem: HLD (hyperlipidemia).   ·  Plan: -Patient with history of HLD and takes Rosuvastatin 40mg daily, continue with Atorvastatin 80mg daily via therapeutic interchange.    > depression.   ·  Plan: -Patient with a history of depression, continue Escitalopram 10mg daily.    >  Need for prophylactic measure.   ·  Plan: lovenox.      > Nutrition, metabolism, and development symptoms.   ·  Plan; adat  Cleared for discharge to [facility]         87 year old female with PMH CVA, CAD, HTN, HLD and depression who presents after a fall found to have acute nondisplaced, mildly impacted left parasymphyseal pubic fracture and acute displaced L olecranon fracture.    > Syncope.   ·  Plan: -suspect vasovagal etiology  -no fall, was witnessed by nurse and PCA while she was on the toilet having a bowel movement  - s/p IVF, holding lasix,  -monitor, very low suspicion for first time seizure.    ·  Problem: Olecranon fracture.   ·  Plan: sp ORIF  DVT ppx  pain control - po tramadol  f/u PT OT  NWB LUE but OK to be WBAT LUE in splint when using a platform walker, per ortho    insomnia - add melatonin  bowel regimen - miralax/senna, standing.    > Pubic ramus fracture.   ·  Plan: as above f/u PT  WBAT L LE.      > CAD (coronary artery disease).   ·  Plan: -Patient with a history of CAD, continue Metoprolol Succinate 50mg daily and takes Rosuvastatin 40mg daily, continue with Atorvastatin 80mg daily via therapeutic interchange.    > CVA (cerebrovascular accident).   ·  Plan: -restart plavix  statin as above.    ·  Problem: HTN (hypertension).   ·  Plan: restart bp meds as tolerated,   restart lasix today at half dose  monitor bp, bmp.      ·  Problem: HLD (hyperlipidemia).   ·  Plan: -Patient with history of HLD and takes Rosuvastatin 40mg daily, continue with Atorvastatin 80mg daily via therapeutic interchange.    > depression.   ·  Plan: -Patient with a history of depression, continue Escitalopram 10mg daily.    >  Need for prophylactic measure.   ·  Plan: lovenox.      > Nutrition, metabolism, and development symptoms.   ·  Plan; adat  Cleared for discharge to [facility]

## 2023-02-06 NOTE — DISCHARGE NOTE NURSING/CASE MANAGEMENT/SOCIAL WORK - PATIENT PORTAL LINK FT
You can access the FollowMyHealth Patient Portal offered by [facility] by registering at the following website: [email or web address]. By joining [facility]’s FollowMyHealth portal, you will also be able to view your health information using other applications (apps) compatible with our system.

## 2023-02-06 NOTE — DISCHARGE NOTE PROVIDER - NSDCCPCAREPLAN_GEN_ALL_CORE_FT
PRINCIPAL DISCHARGE DIAGNOSIS  Diagnosis: Olecranon fracture  Assessment and Plan of Treatment: Ortho was consulted s/p open reduction internal fixation of Left olecranon on 2/1/23. You received 1 unit of blood post procedure- with appropriate response in hemoglobin.   Per ortho recommendations - non-weight bearing to left upper extremity in a splint and Posey block, but okay to be Weight bearing as tolerated to  left upper extremity in splint when using a platform walker;    Please Follow up as an outpatient with Dr. Fuller in 10-14 days post   operatively (2/11 to 2/15 approx)      SECONDARY DISCHARGE DIAGNOSES  Diagnosis: Pubic ramus fracture  Assessment and Plan of Treatment: Weight bearing as tolerated to Left lower extremity  PT/OT evaluation recommends subacute rehab    Diagnosis: CAD (coronary artery disease)  Assessment and Plan of Treatment: Coronary artery disease is a condition where the arteries the supply the heart muscle get clogged with fatty deposits & puts you at risk for a heart attack.  Call your doctor if you have any new pain, pressure, or discomfort in the center of your chest, pain, tingling or discomfort in arms, back, neck, jaw, or stomach, shortness of breath, nausea, vomiting, burping or heartburn, sweating, cold and clammy skin, racing or abnormal heartbeat for more than 10 minutes or if they keep coming & going.  Call 911 and do not try to get to hospital by car. eat lots of fruits & vegetables & low fat dairy products, not a lot of meat & fatty foods, walk or some form of physical activity most days of the week, lose weight if you are overweight.  Take your cardiac medication as prescribed to lower cholesterol, to lower blood pressure, and control your blood sugar.      Diagnosis: HLD (hyperlipidemia)  Assessment and Plan of Treatment: Low salt, low fat, low cholesterol diet   Continue medication as prescribed  Exercise, increase your activity level      Diagnosis: HTN (hypertension)  Assessment and Plan of Treatment: Continue to follow a low salt/sodium diet.  Perform physical activities as tolerated in consultation with your Primary Care Provider and physical therapist.  Take all medications as prescribed.  Follow up with your medical doctor for routine blood pressure monitoring at your next visit.  Notify your doctor if you have any of the following symptoms:  Dizziness, lightheadedness, blurry vision, headache, chest pain, or shortness of breath.

## 2023-02-06 NOTE — DISCHARGE NOTE PROVIDER - NSDCFUADDAPPT_GEN_ALL_CORE_FT
Follow-up with your primary care physician within 1 week and Ortho team follow up 2/11/23-2/15/23. Call for appointment.  Please bring all discharge paperwork and list of medications to all follow up appointments  Please call for follow up appointments one day after discharge

## 2023-02-21 ENCOUNTER — NON-APPOINTMENT (OUTPATIENT)
Age: 88
End: 2023-02-21

## 2023-02-22 PROBLEM — E78.5 HYPERLIPIDEMIA, UNSPECIFIED: Chronic | Status: ACTIVE | Noted: 2023-01-31

## 2023-02-22 PROBLEM — I63.9 CEREBRAL INFARCTION, UNSPECIFIED: Chronic | Status: ACTIVE | Noted: 2023-01-31

## 2023-02-22 PROBLEM — I10 ESSENTIAL (PRIMARY) HYPERTENSION: Chronic | Status: ACTIVE | Noted: 2023-01-31

## 2023-02-22 PROBLEM — F32.89 OTHER SPECIFIED DEPRESSIVE EPISODES: Chronic | Status: ACTIVE | Noted: 2023-01-31

## 2023-02-22 PROBLEM — I25.10 ATHEROSCLEROTIC HEART DISEASE OF NATIVE CORONARY ARTERY WITHOUT ANGINA PECTORIS: Chronic | Status: ACTIVE | Noted: 2023-01-31

## 2023-02-28 ENCOUNTER — APPOINTMENT (OUTPATIENT)
Dept: ORTHOPEDIC SURGERY | Facility: CLINIC | Age: 88
End: 2023-02-28
Payer: MEDICARE

## 2023-02-28 DIAGNOSIS — S52.023A DISPLACED FRACTURE OF OLECRANON PROCESS W/OUT INTRAARTICULAR EXTENSION OF UNSPECIFIED ULNA, INITIAL ENCOUNTER FOR CLOSED FRACTURE: ICD-10-CM

## 2023-02-28 DIAGNOSIS — S32.592D OTHER SPECIFIED FRACTURE OF LEFT PUBIS, SUBSEQUENT ENCOUNTER FOR FRACTURE WITH ROUTINE HEALING: ICD-10-CM

## 2023-02-28 PROCEDURE — 73080 X-RAY EXAM OF ELBOW: CPT | Mod: LT

## 2023-02-28 PROCEDURE — 99024 POSTOP FOLLOW-UP VISIT: CPT

## 2023-02-28 PROCEDURE — 73502 X-RAY EXAM HIP UNI 2-3 VIEWS: CPT | Mod: LT

## 2023-02-28 NOTE — ADDENDUM
[FreeTextEntry1] : I, Norah Baeza wrote this note acting as a scribe for Dr. Alberto Fuller on Feb 28, 2023.

## 2023-02-28 NOTE — END OF VISIT
[FreeTextEntry3] : All medical record entries made by the Scribe were at my,  Dr. Alberto Fuller MD., direction and personally dictated by me on 02/28/2023. I have personally reviewed the chart and agree that the record accurately reflects my personal performance of the history, physical exam, assessment and plan.

## 2023-02-28 NOTE — HISTORY OF PRESENT ILLNESS
[de-identified] : s/p Open reduction internal fixation of left olecranon, application of left long arm splint and closed treatment left pubic ramus fracture with physical therapy wbat started on ambulation using a left platform walker. [de-identified] : The patient is a 87 year female who returns for the 1st postoperative visit after undergoing Open reduction internal fixation of left olecranon, application of left long arm splint and closed treatment left pubic ramus fracture with physical therapy wbat started on ambulation using a left platform walker at [facility]. The surgery was on 02/01/2023. She reports to be doing well overall but notes pain when placing weight through the arm when attempting to stand. She states her hip/pelvis pain has improved. She now walks with assistance and gets increased pain after she has rehab/PT. She is recovering in [facility]. [de-identified] : Patient is WDWN, alert, and in no acute distress. Breathing is unlabored. She is grossly oriented to person, place, and time.\par \par She is accompanied by her son today.\par \par Left Hip/Pelvis:\par She presents to the office FWB, without assistance. Increased discomfort to the left hip with WB and ambulation.\par \par Left Elbow:\par Incision site is healing well without signs of postoperative infection.\par Remaining sutures were removed in the office today.\par ROM: 30°-130°\par ROM: Supination and pronation are full \par Normal amount of postoperative edema and tenderness noted. [de-identified] : AP, lateral and oblique views of the LEFT elbow were obtained today and revealed olecranon fracture stabilized by Synthes 2.7 Recon plate 10-hole plate with multiple 2.7 cortical screws and also a 2.7 Tplate from the locking mini frag set with locking and  non-locking screws. The hardware is well aligned and the fracture is healing well. \par \par AP and lateral views of the LEFT hip and pelvis were obtained today and revealed a left pubic ramus fracture. The fracture is healing. [de-identified] : With regard to the left elbow, the remaining sutures were removed in the office today. She was instructed on local wound care and to use Vitamin E oil on the scar. I recommended ROM exercises of the elbow and to use the hand for all ADLs as tolerated.\par \par With regard to the left hip/pelvis, she is WBAT. I recommended gait training. \par \par Follow up in 6 weeks for repeat xrays.

## 2023-04-12 NOTE — DISCHARGE NOTE PROVIDER - DISCHARGE SERVICE FOR PATIENT
I have reviewed and confirmed nurses' notes...
on the discharge service for the patient. I have reviewed and made amendments to the documentation where necessary.

## 2023-05-08 NOTE — CONSULT NOTE ADULT - ATTENDING SUPERVISION STATEMENT
----- Message from Maribell Plasencia sent at 5/6/2023 11:21 AM CDT -----  Contact: pt  Type: Needs Medical Advice         Who Called: pt  Best Call Back Number:[phone]  Additional Information: Requesting a call back regarding pt asking for the office to call her. Pt said she has been waiting 2 weeks for a call back   Please Advise- Thank you        Resident

## 2023-05-12 NOTE — ED PROVIDER NOTE - WET READ LAUNCH FT
Clau Armstrong RN   There are no Wet Read(s) to document.

## 2023-06-19 ENCOUNTER — RX RENEWAL (OUTPATIENT)
Age: 88
End: 2023-06-19

## 2023-06-21 ENCOUNTER — RX RENEWAL (OUTPATIENT)
Age: 88
End: 2023-06-21

## 2023-07-10 ENCOUNTER — RX RENEWAL (OUTPATIENT)
Age: 88
End: 2023-07-10

## 2023-07-20 ENCOUNTER — RX RENEWAL (OUTPATIENT)
Age: 88
End: 2023-07-20

## 2023-07-27 ENCOUNTER — APPOINTMENT (OUTPATIENT)
Dept: INTERNAL MEDICINE | Facility: CLINIC | Age: 88
End: 2023-07-27
Payer: MEDICARE

## 2023-07-27 VITALS
TEMPERATURE: 97.4 F | SYSTOLIC BLOOD PRESSURE: 168 MMHG | HEIGHT: 61.5 IN | HEART RATE: 59 BPM | DIASTOLIC BLOOD PRESSURE: 51 MMHG | WEIGHT: 120 LBS | BODY MASS INDEX: 22.37 KG/M2 | OXYGEN SATURATION: 98 %

## 2023-07-27 VITALS — RESPIRATION RATE: 16 BRPM | SYSTOLIC BLOOD PRESSURE: 135 MMHG | DIASTOLIC BLOOD PRESSURE: 64 MMHG

## 2023-07-27 DIAGNOSIS — Z87.898 PERSONAL HISTORY OF OTHER SPECIFIED CONDITIONS: ICD-10-CM

## 2023-07-27 DIAGNOSIS — R53.83 OTHER FATIGUE: ICD-10-CM

## 2023-07-27 DIAGNOSIS — I05.0 RHEUMATIC MITRAL STENOSIS: ICD-10-CM

## 2023-07-27 DIAGNOSIS — M47.22 OTHER SPONDYLOSIS WITH RADICULOPATHY, CERVICAL REGION: ICD-10-CM

## 2023-07-27 DIAGNOSIS — R25.2 CRAMP AND SPASM: ICD-10-CM

## 2023-07-27 DIAGNOSIS — D64.9 ANEMIA, UNSPECIFIED: ICD-10-CM

## 2023-07-27 DIAGNOSIS — Z20.822 CONTACT WITH AND (SUSPECTED) EXPOSURE TO COVID-19: ICD-10-CM

## 2023-07-27 PROCEDURE — G0439: CPT

## 2023-07-27 PROCEDURE — 99214 OFFICE O/P EST MOD 30 MIN: CPT | Mod: 25

## 2023-07-27 PROCEDURE — 36415 COLL VENOUS BLD VENIPUNCTURE: CPT

## 2023-07-27 RX ORDER — FUROSEMIDE 40 MG/1
40 TABLET ORAL
Qty: 90 | Refills: 3 | Status: ACTIVE | COMMUNITY
Start: 2022-10-19

## 2023-07-27 RX ORDER — MELOXICAM 7.5 MG/1
7.5 TABLET ORAL
Qty: 30 | Refills: 1 | Status: DISCONTINUED | COMMUNITY
Start: 2022-07-08 | End: 2023-07-27

## 2023-07-27 RX ORDER — DICLOFENAC SODIUM 1% 10 MG/G
1 GEL TOPICAL
Qty: 2 | Refills: 2 | Status: ACTIVE | COMMUNITY
Start: 2023-07-27 | End: 1900-01-01

## 2023-07-27 RX ORDER — CHLORHEXIDINE GLUCONATE 4 %
400 (240 MG) LIQUID (ML) TOPICAL
Qty: 90 | Refills: 0 | Status: DISCONTINUED | COMMUNITY
Start: 2021-11-02 | End: 2023-07-27

## 2023-07-27 RX ORDER — SIMETHICONE 125 MG/1
125 TABLET, CHEWABLE ORAL
Qty: 30 | Refills: 2 | Status: DISCONTINUED | COMMUNITY
Start: 2022-05-31 | End: 2023-07-27

## 2023-07-27 RX ORDER — FEXOFENADINE HCL 60 MG/1
60 TABLET, FILM COATED ORAL
Qty: 60 | Refills: 0 | Status: DISCONTINUED | COMMUNITY
Start: 2022-05-31 | End: 2023-07-27

## 2023-07-27 RX ORDER — GABAPENTIN 100 MG/1
100 CAPSULE ORAL
Qty: 180 | Refills: 3 | Status: DISCONTINUED | COMMUNITY
Start: 2020-05-21 | End: 2023-07-27

## 2023-07-27 RX ORDER — FAMOTIDINE 20 MG/1
20 TABLET ORAL
Qty: 30 | Refills: 1 | Status: DISCONTINUED | COMMUNITY
Start: 2022-05-31 | End: 2023-07-27

## 2023-07-27 RX ORDER — CHLORHEXIDINE GLUCONATE, 0.12% ORAL RINSE 1.2 MG/ML
0.12 SOLUTION DENTAL
Qty: 473 | Refills: 0 | Status: DISCONTINUED | COMMUNITY
Start: 2022-11-14 | End: 2023-07-27

## 2023-07-27 RX ORDER — METOPROLOL SUCCINATE 25 MG/1
25 TABLET, EXTENDED RELEASE ORAL
Qty: 90 | Refills: 3 | Status: ACTIVE | COMMUNITY
Start: 2017-10-30

## 2023-07-27 NOTE — REVIEW OF SYSTEMS
[Fatigue] : fatigue [Dyspnea on Exertion] : dyspnea on exertion [Joint Pain] : joint pain [FreeTextEntry2] : dizziness

## 2023-07-27 NOTE — ASSESSMENT
Writer LM on Vm to confirm the July MD F/U. No need of adding anew consult for referral received.    [FreeTextEntry1] : Diagnoses #1 tiredness most likely secondary to her age and depression.  To check CBC, thyroid function test, CPK and B12\par 2.  History of lung nodules and pleural plaques.  To have follow-up CT of the chest\par #3 mitral stenosis and aortic stenosis.stable. Patient is followed by cardiologist.  Stable\par Since on furosemide 40 mg p.o. once a day CMP sent to the lab\par #4 hypertension. Blood pressure is stable  Continue current treatment and low-salt diet.  CMP to lab\par #5 hyperlipidemia, same treatment and low-fat diet. Lipids to lab\par #6 monitor liver toxicity due to chronic medication use.  CMP to lab\par #7 hypovitaminosis D., being supplemented. Level  sent to the lab.\par #8 depression.  Still depressed although better.  Not suicidal.  To increase the escitalopram to 10 mg p.o. once a day\par 9.  History of leg cramps.  Continue with current magnesium and restart tizanidine 4 mg p.o. every night.  Side effects explained to patient\par 10.  History of anemia, CBC to lab.\par 11.  Dyspnea on exertion most likely secondary to her pulmonary fibrosis.  To have CT of the chest and see the pulmonary doctor every 6 months\par 12.  Cervical spine radiculopathy\par Patient to have physical therapy, apply diclofenac sodium gel 3 times a day for 10 days and as needed and restart tizanidine 4 mg p.o. at night.  Side effects of medication and risk of falling explained to her.  Advised to use cane or walker when she gets up from bed to walk in the house during the night\par Plan .the patient is to return after 3 months.  Total approximately 70 minutes spent especially discussing her depression and ways to improve like joining elderly care center, praying every day and watching films or listening to music she likes..  Total approximately 70 minutes spent

## 2023-07-27 NOTE — HISTORY OF PRESENT ILLNESS
[de-identified] : Patient comes for her  Medicare annual wellness visit and followup of her  chronic medical problems.  Recently patient has  posterior neck pain mostly in  the left side radiating to the left side of her head.  The pain is worse at night.  She has also her known cramps of the legs which overall are better.  Although her escitalopram helps her with the depression still she is  to some degree depressed and crying.  Not suicidal.  Patient feels well without any chest pain, shortness of breath, paroxysmal nocturnal dyspnea or orthopnea.  She has though for long time slight dyspnea on exertion and she feels chronic tiredness

## 2023-07-27 NOTE — HEALTH RISK ASSESSMENT
[Good] : ~his/her~ current health as good [Fair] :  ~his/her~ mood as fair [FreeTextEntry1] : health [No] : No [de-identified] : 1 fall  with bone fractures few months ago [Change in mental status noted] : No change in mental status noted [Language] : denies difficulty with language [Sexually Active] : not sexually active [Reports changes in hearing] : Reports no changes in hearing [Reports changes in vision] : Reports no changes in vision [Reports changes in dental health] : Reports no changes in dental health [Guns at Home] : no guns at home [Travel to Developing Areas] : does not  travel to developing areas [TB Exposure] : is not being exposed to tuberculosis

## 2023-07-27 NOTE — PHYSICAL EXAM
[Kyphosis] : no kyphosis [Scoliosis] : no scoliosis [de-identified] : 2/6 systolic murmur at the apex to l axilla [de-identified] : Stiffness and tenderness of the left neck area

## 2023-07-28 LAB
25(OH)D3 SERPL-MCNC: 38.6 NG/ML
ALBUMIN SERPL ELPH-MCNC: 4.1 G/DL
ALP BLD-CCNC: 86 U/L
ALT SERPL-CCNC: 16 U/L
ANION GAP SERPL CALC-SCNC: 11 MMOL/L
APPEARANCE: CLEAR
AST SERPL-CCNC: 21 U/L
BACTERIA: NEGATIVE /HPF
BILIRUB SERPL-MCNC: 0.4 MG/DL
BILIRUBIN URINE: NEGATIVE
BLOOD URINE: ABNORMAL
BUN SERPL-MCNC: 20 MG/DL
CALCIUM SERPL-MCNC: 9.1 MG/DL
CAST: 0 /LPF
CHLORIDE SERPL-SCNC: 102 MMOL/L
CHOLEST SERPL-MCNC: 170 MG/DL
CO2 SERPL-SCNC: 26 MMOL/L
COLOR: YELLOW
CREAT SERPL-MCNC: 0.99 MG/DL
EGFR: 55 ML/MIN/1.73M2
EPITHELIAL CELLS: 1 /HPF
GLUCOSE QUALITATIVE U: NEGATIVE MG/DL
GLUCOSE SERPL-MCNC: 95 MG/DL
HDLC SERPL-MCNC: 55 MG/DL
KETONES URINE: NEGATIVE MG/DL
LDLC SERPL CALC-MCNC: 99 MG/DL
LEUKOCYTE ESTERASE URINE: ABNORMAL
MICROSCOPIC-UA: NORMAL
NITRITE URINE: NEGATIVE
NONHDLC SERPL-MCNC: 114 MG/DL
PH URINE: 6.5
POTASSIUM SERPL-SCNC: 4.8 MMOL/L
PROT SERPL-MCNC: 6.9 G/DL
PROTEIN URINE: NEGATIVE MG/DL
RED BLOOD CELLS URINE: 5 /HPF
SODIUM SERPL-SCNC: 140 MMOL/L
SPECIFIC GRAVITY URINE: 1.02
TRIGL SERPL-MCNC: 83 MG/DL
UROBILINOGEN URINE: 0.2 MG/DL
WHITE BLOOD CELLS URINE: 4 /HPF

## 2023-07-31 LAB
MAGNESIUM SERPL-MCNC: 2.4 MG/DL
T4 FREE SERPL-MCNC: 0.9 NG/DL
TSH SERPL-ACNC: 5.52 UIU/ML
VIT B12 SERPL-MCNC: 803 PG/ML

## 2023-08-01 LAB
CK BB SERPL ELPH-CCNC: 0 %
CK MB CFR SERPL ELPH: 0 %
CK MM SERPL ELPH-CCNC: 100 %
CREATINE KINASE,TOTAL,SERUM: 85 U/L
MACRO TYPE 1: 0 %
MACRO TYPE 2: 0 %

## 2023-08-12 ENCOUNTER — OUTPATIENT (OUTPATIENT)
Dept: OUTPATIENT SERVICES | Facility: HOSPITAL | Age: 88
LOS: 1 days | End: 2023-08-12
Payer: MEDICARE

## 2023-08-12 ENCOUNTER — APPOINTMENT (OUTPATIENT)
Dept: CT IMAGING | Facility: CLINIC | Age: 88
End: 2023-08-12
Payer: MEDICARE

## 2023-08-12 DIAGNOSIS — R91.1 SOLITARY PULMONARY NODULE: ICD-10-CM

## 2023-08-12 DIAGNOSIS — Z00.8 ENCOUNTER FOR OTHER GENERAL EXAMINATION: ICD-10-CM

## 2023-08-12 PROCEDURE — 71250 CT THORAX DX C-: CPT

## 2023-08-12 PROCEDURE — 71250 CT THORAX DX C-: CPT | Mod: 26

## 2023-09-14 ENCOUNTER — RX RENEWAL (OUTPATIENT)
Age: 88
End: 2023-09-14

## 2023-09-14 RX ORDER — ROSUVASTATIN CALCIUM 40 MG/1
40 TABLET, FILM COATED ORAL
Qty: 90 | Refills: 3 | Status: ACTIVE | COMMUNITY
Start: 2021-07-01 | End: 1900-01-01

## 2023-09-18 ENCOUNTER — APPOINTMENT (OUTPATIENT)
Dept: PULMONOLOGY | Facility: CLINIC | Age: 88
End: 2023-09-18

## 2023-09-20 ENCOUNTER — APPOINTMENT (OUTPATIENT)
Dept: PULMONOLOGY | Facility: CLINIC | Age: 88
End: 2023-09-20
Payer: MEDICARE

## 2023-09-20 VITALS
SYSTOLIC BLOOD PRESSURE: 114 MMHG | WEIGHT: 115 LBS | TEMPERATURE: 96.8 F | OXYGEN SATURATION: 93 % | HEART RATE: 68 BPM | DIASTOLIC BLOOD PRESSURE: 70 MMHG | BODY MASS INDEX: 21.38 KG/M2

## 2023-09-20 DIAGNOSIS — I35.0 NONRHEUMATIC AORTIC (VALVE) STENOSIS: ICD-10-CM

## 2023-09-20 PROCEDURE — 99215 OFFICE O/P EST HI 40 MIN: CPT | Mod: 25

## 2023-09-20 PROCEDURE — 94727 GAS DIL/WSHOT DETER LNG VOL: CPT

## 2023-09-20 PROCEDURE — 94729 DIFFUSING CAPACITY: CPT

## 2023-09-20 PROCEDURE — 94060 EVALUATION OF WHEEZING: CPT

## 2023-09-26 ENCOUNTER — OUTPATIENT (OUTPATIENT)
Dept: OUTPATIENT SERVICES | Facility: HOSPITAL | Age: 88
LOS: 1 days | End: 2023-09-26
Payer: MEDICARE

## 2023-09-26 ENCOUNTER — APPOINTMENT (OUTPATIENT)
Dept: RADIOLOGY | Facility: CLINIC | Age: 88
End: 2023-09-26
Payer: MEDICARE

## 2023-09-26 DIAGNOSIS — Z00.8 ENCOUNTER FOR OTHER GENERAL EXAMINATION: ICD-10-CM

## 2023-09-26 DIAGNOSIS — I50.32 CHRONIC DIASTOLIC (CONGESTIVE) HEART FAILURE: ICD-10-CM

## 2023-09-26 PROCEDURE — 71046 X-RAY EXAM CHEST 2 VIEWS: CPT | Mod: 26

## 2023-09-26 PROCEDURE — 71046 X-RAY EXAM CHEST 2 VIEWS: CPT

## 2023-11-02 ENCOUNTER — RX RENEWAL (OUTPATIENT)
Age: 88
End: 2023-11-02

## 2023-11-02 RX ORDER — TIZANIDINE 4 MG/1
4 TABLET ORAL
Qty: 90 | Refills: 1 | Status: ACTIVE | COMMUNITY
Start: 2022-05-24 | End: 1900-01-01

## 2023-11-15 ENCOUNTER — APPOINTMENT (OUTPATIENT)
Dept: INTERNAL MEDICINE | Facility: CLINIC | Age: 88
End: 2023-11-15
Payer: MEDICARE

## 2023-11-15 VITALS — SYSTOLIC BLOOD PRESSURE: 148 MMHG | DIASTOLIC BLOOD PRESSURE: 67 MMHG | RESPIRATION RATE: 14 BRPM

## 2023-11-15 VITALS
HEIGHT: 61 IN | BODY MASS INDEX: 22.84 KG/M2 | DIASTOLIC BLOOD PRESSURE: 68 MMHG | SYSTOLIC BLOOD PRESSURE: 186 MMHG | HEART RATE: 63 BPM | WEIGHT: 121 LBS | TEMPERATURE: 97.7 F | OXYGEN SATURATION: 96 %

## 2023-11-15 DIAGNOSIS — K71.6 TOXIC LIVER DISEASE WITH HEPATITIS, NOT ELSEWHERE CLASSIFIED: ICD-10-CM

## 2023-11-15 DIAGNOSIS — F32.A DEPRESSION, UNSPECIFIED: ICD-10-CM

## 2023-11-15 DIAGNOSIS — F03.90 UNSPECIFIED DEMENTIA W/OUT BEHAVIORAL DISTURBANCE: ICD-10-CM

## 2023-11-15 DIAGNOSIS — I10 ESSENTIAL (PRIMARY) HYPERTENSION: ICD-10-CM

## 2023-11-15 DIAGNOSIS — Z23 ENCOUNTER FOR IMMUNIZATION: ICD-10-CM

## 2023-11-15 DIAGNOSIS — I35.0 NONRHEUMATIC AORTIC (VALVE) STENOSIS: ICD-10-CM

## 2023-11-15 DIAGNOSIS — Z00.00 ENCOUNTER FOR GENERAL ADULT MEDICAL EXAMINATION W/OUT ABNORMAL FINDINGS: ICD-10-CM

## 2023-11-15 DIAGNOSIS — T50.905A TOXIC LIVER DISEASE WITH HEPATITIS, NOT ELSEWHERE CLASSIFIED: ICD-10-CM

## 2023-11-15 DIAGNOSIS — E55.9 VITAMIN D DEFICIENCY, UNSPECIFIED: ICD-10-CM

## 2023-11-15 DIAGNOSIS — J84.9 INTERSTITIAL PULMONARY DISEASE, UNSPECIFIED: ICD-10-CM

## 2023-11-15 DIAGNOSIS — I50.32 CHRONIC DIASTOLIC (CONGESTIVE) HEART FAILURE: ICD-10-CM

## 2023-11-15 DIAGNOSIS — E78.2 MIXED HYPERLIPIDEMIA: ICD-10-CM

## 2023-11-15 DIAGNOSIS — R91.1 SOLITARY PULMONARY NODULE: ICD-10-CM

## 2023-11-15 PROCEDURE — 90662 IIV NO PRSV INCREASED AG IM: CPT

## 2023-11-15 PROCEDURE — 99215 OFFICE O/P EST HI 40 MIN: CPT | Mod: 25

## 2023-11-15 PROCEDURE — G0008: CPT

## 2023-11-15 PROCEDURE — 36415 COLL VENOUS BLD VENIPUNCTURE: CPT

## 2023-11-15 RX ORDER — LOSARTAN POTASSIUM 100 MG/1
100 TABLET, FILM COATED ORAL
Qty: 90 | Refills: 3 | Status: ACTIVE | COMMUNITY
Start: 2022-01-24 | End: 1900-01-01

## 2023-11-16 ENCOUNTER — TRANSCRIPTION ENCOUNTER (OUTPATIENT)
Age: 88
End: 2023-11-16

## 2023-11-16 DIAGNOSIS — F41.0 PANIC DISORDER [EPISODIC PAROXYSMAL ANXIETY]: ICD-10-CM

## 2023-11-16 LAB
25(OH)D3 SERPL-MCNC: 46.2 NG/ML
ALBUMIN SERPL ELPH-MCNC: 4.6 G/DL
ALP BLD-CCNC: 74 U/L
ALT SERPL-CCNC: 15 U/L
ANION GAP SERPL CALC-SCNC: 11 MMOL/L
APPEARANCE: CLEAR
AST SERPL-CCNC: 22 U/L
BACTERIA: NEGATIVE /HPF
BASOPHILS # BLD AUTO: 0.07 K/UL
BASOPHILS NFR BLD AUTO: 0.9 %
BILIRUB SERPL-MCNC: 0.6 MG/DL
BILIRUBIN URINE: NEGATIVE
BLOOD URINE: ABNORMAL
BUN SERPL-MCNC: 21 MG/DL
CALCIUM SERPL-MCNC: 9.5 MG/DL
CAST: 1 /LPF
CHLORIDE SERPL-SCNC: 105 MMOL/L
CHOLEST SERPL-MCNC: 194 MG/DL
CO2 SERPL-SCNC: 28 MMOL/L
COLOR: YELLOW
CREAT SERPL-MCNC: 0.97 MG/DL
EGFR: 56 ML/MIN/1.73M2
EOSINOPHIL # BLD AUTO: 0.18 K/UL
EOSINOPHIL NFR BLD AUTO: 2.2 %
EPITHELIAL CELLS: 1 /HPF
GLUCOSE QUALITATIVE U: NEGATIVE MG/DL
GLUCOSE SERPL-MCNC: 104 MG/DL
HCT VFR BLD CALC: 40.3 %
HDLC SERPL-MCNC: 66 MG/DL
HGB BLD-MCNC: 12.8 G/DL
IMM GRANULOCYTES NFR BLD AUTO: 0.2 %
KETONES URINE: NEGATIVE MG/DL
LDLC SERPL CALC-MCNC: 111 MG/DL
LEUKOCYTE ESTERASE URINE: ABNORMAL
LYMPHOCYTES # BLD AUTO: 1.57 K/UL
LYMPHOCYTES NFR BLD AUTO: 19.5 %
MAN DIFF?: NORMAL
MCHC RBC-ENTMCNC: 31.1 PG
MCHC RBC-ENTMCNC: 31.8 GM/DL
MCV RBC AUTO: 97.8 FL
MICROSCOPIC-UA: NORMAL
MONOCYTES # BLD AUTO: 0.62 K/UL
MONOCYTES NFR BLD AUTO: 7.7 %
NEUTROPHILS # BLD AUTO: 5.6 K/UL
NEUTROPHILS NFR BLD AUTO: 69.5 %
NITRITE URINE: NEGATIVE
NONHDLC SERPL-MCNC: 128 MG/DL
PH URINE: 6.5
PLATELET # BLD AUTO: 262 K/UL
POTASSIUM SERPL-SCNC: 4.5 MMOL/L
PROT SERPL-MCNC: 7.3 G/DL
PROTEIN URINE: NORMAL MG/DL
RBC # BLD: 4.12 M/UL
RBC # FLD: 14.6 %
RED BLOOD CELLS URINE: 6 /HPF
SODIUM SERPL-SCNC: 143 MMOL/L
SPECIFIC GRAVITY URINE: 1.02
TRIGL SERPL-MCNC: 93 MG/DL
UROBILINOGEN URINE: 0.2 MG/DL
WBC # FLD AUTO: 8.06 K/UL
WHITE BLOOD CELLS URINE: 5 /HPF

## 2023-11-16 RX ORDER — LORAZEPAM 0.5 MG/1
0.5 TABLET ORAL
Qty: 30 | Refills: 0 | Status: ACTIVE | COMMUNITY
Start: 2023-11-16 | End: 1900-01-01

## 2024-01-12 ENCOUNTER — APPOINTMENT (OUTPATIENT)
Dept: INTERNAL MEDICINE | Facility: CLINIC | Age: 89
End: 2024-01-12

## 2024-01-22 ENCOUNTER — RX RENEWAL (OUTPATIENT)
Age: 89
End: 2024-01-22

## 2024-01-22 RX ORDER — ERGOCALCIFEROL 1.25 MG/1
1.25 MG CAPSULE, LIQUID FILLED ORAL
Qty: 12 | Refills: 1 | Status: ACTIVE | COMMUNITY
Start: 2020-03-23 | End: 1900-01-01

## 2024-03-12 ENCOUNTER — TRANSCRIPTION ENCOUNTER (OUTPATIENT)
Age: 89
End: 2024-03-12

## 2024-06-18 ENCOUNTER — RX RENEWAL (OUTPATIENT)
Age: 89
End: 2024-06-18

## 2024-06-18 RX ORDER — ESCITALOPRAM OXALATE 10 MG/1
10 TABLET ORAL
Qty: 90 | Refills: 3 | Status: ACTIVE | COMMUNITY
Start: 2018-06-04 | End: 1900-01-01

## 2024-06-18 RX ORDER — CLOPIDOGREL BISULFATE 75 MG/1
75 TABLET, FILM COATED ORAL
Qty: 90 | Refills: 3 | Status: ACTIVE | COMMUNITY
Start: 2021-07-01 | End: 1900-01-01

## 2024-07-20 ENCOUNTER — RX RENEWAL (OUTPATIENT)
Age: 89
End: 2024-07-20

## 2024-09-17 ENCOUNTER — RX RENEWAL (OUTPATIENT)
Age: 89
End: 2024-09-17

## 2024-09-21 NOTE — PROGRESS NOTE ADULT - PROBLEM SELECTOR PLAN 1
-Patient s/p fall found to have acute displaced L olecranon fracture  -plan for ORIF today  -Patient>4METS  -RCRI score 1, class II risk, 6% risk of MI, cardiac arrest or death, no further workup indicated prior to OR, optimized medically, moderate risk for moderate risk surgery
sp ORIF  DVT ppx  pain control - po tramadol  f/u PT OT  NWB LUE    insomnia - add melatonin  bowel regimen
sp ORIF  DVT ppx  pain control - po oxy  f/u PT OT  NWB LUE    insomnia - add melatonin
sp ORIF  DVT ppx  pain control - po tramadol  f/u PT OT  NWB LUE but OK to be WBAT LUE in splint when using a platform walker, per ortho    insomnia - add melatonin  bowel regimen - miralax/senna, standing
-suspect vasovagal etiology  -no fall, was witnessed by nurse and PCA while she was on the toilet having a bowel movement  -give IVF, check orthostatics when finished  -monitor, very low suspicion for first time seizure
PROVIDER:[TOKEN:[[id number]]],PROVIDER:[TOKEN:[[id number]]]

## 2025-02-22 NOTE — PROVIDER CONTACT NOTE (OTHER) - REASON
Anesthesia Post-op Note    Patient: Shey Sagastume  Procedure(s) Performed: ARTHROPLASTY, HIP, TOTAL, ANTERIOR APPROACH RIGHT (Right: Hip)  Anesthesia type: General    Vitals Value Taken Time   Temp 36.7 °C (98.1 °F) 02/22/25 1100   Pulse 72 02/22/25 1100   Resp 21 02/22/25 1050   SpO2 97 % 02/22/25 1122   /71 02/22/25 1100         Patient Location: PACU Phase 1  Post-op Vital Signs:stable  Level of Consciousness: participates in exam, awake and return to baseline  Respiratory Status: spontaneous ventilation  Cardiovascular stable  Hydration: euvolemic  Pain Management: adequately controlled  Handoff: Handoff to receiving clinician was performed and questions were answered  Vomiting: none  Nausea: None  Airway Patency:patent  Post-op Assessment: awake, alert, appropriately conversant, or baseline  Comments: Extubated without incident. Transported to PACU in stable condition. Report given.        No notable events documented.                      
pts BP- 121/39 (electronic) and 110/42 (manual)

## (undated) DEVICE — STRYKER INTERPULSE HANDPIECE W IRR SUCTION TUBE

## (undated) DEVICE — BIPOLAR FORCEP SYMMETRY BAYONET 7" X 1.5MM SMOOTH (SILVER)

## (undated) DEVICE — DRAPE IOBAN 23" X 23"

## (undated) DEVICE — PACK EXTREMITY

## (undated) DEVICE — SLING SHOULDER IMMOBILIZER CLINIC LARGE

## (undated) DEVICE — DRAPE MAYO STAND 30"

## (undated) DEVICE — DRAPE TOWEL BLUE 17" X 24"

## (undated) DEVICE — TOURNIQUET CUFF 18" DUAL PORT SINGLE BLADDER LUER LOCK (BLACK)

## (undated) DEVICE — GLV 8.5 PROTEXIS (WHITE)

## (undated) DEVICE — SOL IRR BAG NS 0.9% 3000ML

## (undated) DEVICE — MARKING PEN W RULER

## (undated) DEVICE — ELCTR BIPOLAR CORD J&J 12FT DISP

## (undated) DEVICE — GLV 9 DERMAPRENE ULTRA

## (undated) DEVICE — VISITEC 4X4

## (undated) DEVICE — TOURNIQUET CUFF 24" DUAL PORT SINGLE BLADDER LUER LOCK  (BLACK)

## (undated) DEVICE — TAPE SILK 3"

## (undated) DEVICE — DRSG STERISTRIPS 0.5 X 4"

## (undated) DEVICE — MEDICATION LABELS W MARKER

## (undated) DEVICE — GLV 8 PROTEXIS (WHITE)

## (undated) DEVICE — GLV 7 PROTEXIS (WHITE)

## (undated) DEVICE — POSITIONER BOOT CRADLE

## (undated) DEVICE — GLV 6.5 PROTEXIS (WHITE)

## (undated) DEVICE — GLV 7.5 PROTEXIS (WHITE)

## (undated) DEVICE — GOWN TRIMAX LG

## (undated) DEVICE — LAP PAD 18 X 18"

## (undated) DEVICE — DRAPE C ARM UNIVERSAL

## (undated) DEVICE — SUT SURGIPRO 3-0 18" P-12

## (undated) DEVICE — DRSG STOCKINETTE IMPERVIOUS MED

## (undated) DEVICE — WARMING BLANKET LOWER ADULT

## (undated) DEVICE — STAPLER SKIN VISI-STAT 35 WIDE

## (undated) DEVICE — Device

## (undated) DEVICE — DRSG ACE BANDAGE 4" NS

## (undated) DEVICE — POSITIONER CARDIAC BUMP

## (undated) DEVICE — SUT POLYSORB 3-0 30" P-12 UNDYED

## (undated) DEVICE — SOL IRR POUR NS 0.9% 500ML

## (undated) DEVICE — SOL IRR POUR H2O 250ML

## (undated) DEVICE — VENODYNE/SCD SLEEVE CALF LARGE

## (undated) DEVICE — POSITIONER FOAM EGG CRATE ULNAR 2PCS (PINK)